# Patient Record
Sex: MALE | Race: WHITE | NOT HISPANIC OR LATINO | Employment: OTHER | URBAN - METROPOLITAN AREA
[De-identification: names, ages, dates, MRNs, and addresses within clinical notes are randomized per-mention and may not be internally consistent; named-entity substitution may affect disease eponyms.]

---

## 2018-02-28 PROBLEM — I26.99 MULTIPLE PULMONARY EMBOLI: Status: ACTIVE | Noted: 2018-02-28

## 2018-02-28 PROBLEM — I26.99 PULMONARY EMBOLISM AND INFARCTION: Status: ACTIVE | Noted: 2018-02-28

## 2018-02-28 PROBLEM — I50.811 ACUTE RIGHT VENTRICULAR HEART FAILURE: Status: ACTIVE | Noted: 2018-02-28

## 2018-02-28 PROBLEM — I50.43 ACUTE ON CHRONIC COMBINED SYSTOLIC AND DIASTOLIC CHF, NYHA CLASS 4: Status: ACTIVE | Noted: 2018-02-28

## 2018-02-28 PROBLEM — I50.813 ACUTE ON CHRONIC SYSTOLIC RIGHT HEART FAILURE: Status: ACTIVE | Noted: 2018-02-28

## 2018-03-01 PROBLEM — I26.99 PULMONARY EMBOLISM: Status: ACTIVE | Noted: 2018-03-01

## 2018-04-20 ENCOUNTER — TELEPHONE (OUTPATIENT)
Dept: TRANSPLANT | Facility: CLINIC | Age: 53
End: 2018-04-20

## 2018-04-20 DIAGNOSIS — I50.9 CONGESTIVE HEART FAILURE, UNSPECIFIED HF CHRONICITY, UNSPECIFIED HEART FAILURE TYPE: Primary | ICD-10-CM

## 2018-04-22 PROBLEM — R09.02 HYPOXEMIA: Status: ACTIVE | Noted: 2018-04-22

## 2018-04-22 PROBLEM — R93.89 ABNORMAL CHEST X-RAY: Status: ACTIVE | Noted: 2018-04-22

## 2018-04-23 PROBLEM — R63.8 ALTERATION IN NUTRITION: Status: ACTIVE | Noted: 2018-04-23

## 2018-04-24 PROBLEM — J90 PLEURAL EFFUSION: Status: ACTIVE | Noted: 2018-04-24

## 2018-04-25 PROBLEM — J18.9 RIGHT LOWER LOBE PNEUMONIA: Status: ACTIVE | Noted: 2018-04-25

## 2018-05-04 NOTE — TELEPHONE ENCOUNTER
REFERRAL NOTE:    Da Cruz has been referred to the pre-heart transplant office for consideration for orthotopic heart transplantation by Keenan Cyr Jr. Patient's appointments have been scheduled for 05/21/18 as requested to accommodate patients time request. Information was provided and questions were answered.  Copies of AHF/Transplant handout, appointment letter and directions to AHF/ Transplant Clinic were mailed to the patient. My contact information was given. Call PRN. Left message with Leanne with answering service of patient scheduled appointment information with Dr García. My contact information given.

## 2018-05-12 PROBLEM — I50.20 ACC/AHA STAGE C SYSTOLIC HEART FAILURE DUE TO ISCHEMIC CARDIOMYOPATHY: Status: ACTIVE | Noted: 2018-05-12

## 2018-05-12 PROBLEM — I95.9 HYPOTENSION: Status: ACTIVE | Noted: 2018-05-12

## 2018-05-12 PROBLEM — I25.5 ACC/AHA STAGE C SYSTOLIC HEART FAILURE DUE TO ISCHEMIC CARDIOMYOPATHY: Status: ACTIVE | Noted: 2018-05-12

## 2018-05-12 PROBLEM — E86.0 DEHYDRATION: Status: ACTIVE | Noted: 2018-05-12

## 2018-05-21 ENCOUNTER — HOSPITAL ENCOUNTER (INPATIENT)
Facility: HOSPITAL | Age: 53
LOS: 4 days | Discharge: LEFT BEFORE DISCHARGE | DRG: 291 | End: 2018-05-25
Attending: INTERNAL MEDICINE | Admitting: INTERNAL MEDICINE
Payer: MEDICARE

## 2018-05-21 ENCOUNTER — HOSPITAL ENCOUNTER (OUTPATIENT)
Dept: PULMONOLOGY | Facility: CLINIC | Age: 53
Discharge: HOME | DRG: 291 | End: 2018-05-21
Payer: MEDICARE

## 2018-05-21 ENCOUNTER — INITIAL CONSULT (OUTPATIENT)
Dept: TRANSPLANT | Facility: CLINIC | Age: 53
DRG: 291 | End: 2018-05-21
Payer: MEDICARE

## 2018-05-21 ENCOUNTER — DOCUMENTATION ONLY (OUTPATIENT)
Dept: TRANSPLANT | Facility: CLINIC | Age: 53
End: 2018-05-21

## 2018-05-21 VITALS
SYSTOLIC BLOOD PRESSURE: 92 MMHG | WEIGHT: 210 LBS | HEIGHT: 71 IN | WEIGHT: 206.38 LBS | BODY MASS INDEX: 29.4 KG/M2 | HEART RATE: 51 BPM | BODY MASS INDEX: 28.89 KG/M2 | HEIGHT: 71 IN | DIASTOLIC BLOOD PRESSURE: 68 MMHG

## 2018-05-21 DIAGNOSIS — I50.9 ACUTE DECOMPENSATED HEART FAILURE: ICD-10-CM

## 2018-05-21 DIAGNOSIS — Z95.810 HISTORY OF IMPLANTABLE CARDIOVERTER-DEFIBRILLATOR (ICD) PLACEMENT: ICD-10-CM

## 2018-05-21 DIAGNOSIS — I26.99 MULTIPLE PULMONARY EMBOLI: ICD-10-CM

## 2018-05-21 DIAGNOSIS — I26.99 OTHER PULMONARY EMBOLISM WITHOUT ACUTE COR PULMONALE, UNSPECIFIED CHRONICITY: ICD-10-CM

## 2018-05-21 DIAGNOSIS — J15.212 PNEUMONIA OF RIGHT LOWER LOBE DUE TO METHICILLIN RESISTANT STAPHYLOCOCCUS AUREUS (MRSA): ICD-10-CM

## 2018-05-21 DIAGNOSIS — I50.811 ACUTE RIGHT VENTRICULAR HEART FAILURE: ICD-10-CM

## 2018-05-21 DIAGNOSIS — I25.10 CORONARY ARTERY DISEASE INVOLVING NATIVE CORONARY ARTERY OF NATIVE HEART WITHOUT ANGINA PECTORIS: Primary | Chronic | ICD-10-CM

## 2018-05-21 DIAGNOSIS — I50.43 ACUTE ON CHRONIC COMBINED SYSTOLIC AND DIASTOLIC HEART FAILURE: Primary | ICD-10-CM

## 2018-05-21 DIAGNOSIS — I50.43 ACUTE ON CHRONIC COMBINED SYSTOLIC AND DIASTOLIC CHF, NYHA CLASS 4: ICD-10-CM

## 2018-05-21 DIAGNOSIS — J86.9 EMPYEMA LUNG: ICD-10-CM

## 2018-05-21 DIAGNOSIS — I25.10 CORONARY ARTERY DISEASE INVOLVING NATIVE CORONARY ARTERY OF NATIVE HEART WITHOUT ANGINA PECTORIS: ICD-10-CM

## 2018-05-21 DIAGNOSIS — I50.9 CONGESTIVE HEART FAILURE, UNSPECIFIED HF CHRONICITY, UNSPECIFIED HEART FAILURE TYPE: ICD-10-CM

## 2018-05-21 DIAGNOSIS — I50.43 ACUTE ON CHRONIC COMBINED SYSTOLIC AND DIASTOLIC HEART FAILURE: ICD-10-CM

## 2018-05-21 DIAGNOSIS — I50.43 ACUTE ON CHRONIC COMBINED SYSTOLIC (CONGESTIVE) AND DIASTOLIC (CONGESTIVE) HEART FAILURE: ICD-10-CM

## 2018-05-21 LAB
BACTERIA #/AREA URNS AUTO: ABNORMAL /HPF
BILIRUB UR QL STRIP: NEGATIVE
CLARITY UR REFRACT.AUTO: ABNORMAL
COLOR UR AUTO: YELLOW
GLUCOSE UR QL STRIP: NEGATIVE
HGB UR QL STRIP: ABNORMAL
HYALINE CASTS UR QL AUTO: 0 /LPF
KETONES UR QL STRIP: NEGATIVE
LEUKOCYTE ESTERASE UR QL STRIP: NEGATIVE
MICROSCOPIC COMMENT: ABNORMAL
NITRITE UR QL STRIP: NEGATIVE
PH UR STRIP: 6 [PH] (ref 5–8)
PROT UR QL STRIP: ABNORMAL
RBC #/AREA URNS AUTO: 9 /HPF (ref 0–4)
SP GR UR STRIP: 1.01 (ref 1–1.03)
URN SPEC COLLECT METH UR: ABNORMAL
UROBILINOGEN UR STRIP-ACNC: NEGATIVE EU/DL
WBC #/AREA URNS AUTO: 4 /HPF (ref 0–5)

## 2018-05-21 PROCEDURE — 99900035 HC TECH TIME PER 15 MIN (STAT): Mod: NTX

## 2018-05-21 PROCEDURE — 3008F BODY MASS INDEX DOCD: CPT | Mod: CPTII,NTX,S$GLB, | Performed by: INTERNAL MEDICINE

## 2018-05-21 PROCEDURE — A4216 STERILE WATER/SALINE, 10 ML: HCPCS | Mod: NTX | Performed by: INTERNAL MEDICINE

## 2018-05-21 PROCEDURE — 63600175 PHARM REV CODE 636 W HCPCS: Mod: NTX | Performed by: INTERNAL MEDICINE

## 2018-05-21 PROCEDURE — 99223 1ST HOSP IP/OBS HIGH 75: CPT | Mod: NTX,,, | Performed by: INTERNAL MEDICINE

## 2018-05-21 PROCEDURE — 99999 PR PBB SHADOW E&M-EST. PATIENT-LVL III: CPT | Mod: PBBFAC,TXP,, | Performed by: INTERNAL MEDICINE

## 2018-05-21 PROCEDURE — 94761 N-INVAS EAR/PLS OXIMETRY MLT: CPT | Mod: NTX

## 2018-05-21 PROCEDURE — 25000003 PHARM REV CODE 250: Mod: NTX | Performed by: INTERNAL MEDICINE

## 2018-05-21 PROCEDURE — 81001 URINALYSIS AUTO W/SCOPE: CPT | Mod: NTX

## 2018-05-21 PROCEDURE — 20600001 HC STEP DOWN PRIVATE ROOM: Mod: NTX

## 2018-05-21 PROCEDURE — 94640 AIRWAY INHALATION TREATMENT: CPT | Mod: NTX

## 2018-05-21 PROCEDURE — 99205 OFFICE O/P NEW HI 60 MIN: CPT | Mod: NTX,S$GLB,, | Performed by: INTERNAL MEDICINE

## 2018-05-21 PROCEDURE — 93010 ELECTROCARDIOGRAM REPORT: CPT | Mod: NTX,,, | Performed by: INTERNAL MEDICINE

## 2018-05-21 PROCEDURE — 94664 DEMO&/EVAL PT USE INHALER: CPT | Mod: NTX

## 2018-05-21 PROCEDURE — 25000242 PHARM REV CODE 250 ALT 637 W/ HCPCS: Mod: NTX | Performed by: INTERNAL MEDICINE

## 2018-05-21 PROCEDURE — 94618 PULMONARY STRESS TESTING: CPT | Mod: NTX,S$GLB,, | Performed by: INTERNAL MEDICINE

## 2018-05-21 PROCEDURE — 27000646 HC AEROBIKA DEVICE: Mod: NTX

## 2018-05-21 PROCEDURE — 93005 ELECTROCARDIOGRAM TRACING: CPT | Mod: NTX

## 2018-05-21 RX ORDER — ALPRAZOLAM 1 MG/1
1 TABLET ORAL 3 TIMES DAILY PRN
Status: DISCONTINUED | OUTPATIENT
Start: 2018-05-21 | End: 2018-05-25 | Stop reason: HOSPADM

## 2018-05-21 RX ORDER — DOCUSATE SODIUM 100 MG/1
100 CAPSULE, LIQUID FILLED ORAL 2 TIMES DAILY
Status: DISCONTINUED | OUTPATIENT
Start: 2018-05-21 | End: 2018-05-25 | Stop reason: HOSPADM

## 2018-05-21 RX ORDER — CIPROFLOXACIN 500 MG/1
500 TABLET ORAL EVERY 12 HOURS
Status: DISCONTINUED | OUTPATIENT
Start: 2018-05-21 | End: 2018-05-24

## 2018-05-21 RX ORDER — ALBUTEROL SULFATE 2 MG/5ML
2 SYRUP ORAL 3 TIMES DAILY
Status: DISCONTINUED | OUTPATIENT
Start: 2018-05-21 | End: 2018-05-21

## 2018-05-21 RX ORDER — AMOXICILLIN AND CLAVULANATE POTASSIUM 875; 125 MG/1; MG/1
1 TABLET, FILM COATED ORAL EVERY 12 HOURS
Status: DISCONTINUED | OUTPATIENT
Start: 2018-05-21 | End: 2018-05-24

## 2018-05-21 RX ORDER — BENZONATATE 100 MG/1
100 CAPSULE ORAL 3 TIMES DAILY PRN
Status: DISCONTINUED | OUTPATIENT
Start: 2018-05-21 | End: 2018-05-25 | Stop reason: HOSPADM

## 2018-05-21 RX ORDER — SODIUM CHLORIDE 0.9 % (FLUSH) 0.9 %
3 SYRINGE (ML) INJECTION EVERY 8 HOURS
Status: DISCONTINUED | OUTPATIENT
Start: 2018-05-21 | End: 2018-05-25 | Stop reason: HOSPADM

## 2018-05-21 RX ORDER — POTASSIUM CHLORIDE 20 MEQ/1
20 TABLET, EXTENDED RELEASE ORAL DAILY
Status: DISCONTINUED | OUTPATIENT
Start: 2018-05-21 | End: 2018-05-21

## 2018-05-21 RX ORDER — POTASSIUM CHLORIDE 20 MEQ/1
40 TABLET, EXTENDED RELEASE ORAL
Status: COMPLETED | OUTPATIENT
Start: 2018-05-21 | End: 2018-05-21

## 2018-05-21 RX ORDER — PANTOPRAZOLE SODIUM 40 MG/1
40 TABLET, DELAYED RELEASE ORAL DAILY
Status: DISCONTINUED | OUTPATIENT
Start: 2018-05-21 | End: 2018-05-25 | Stop reason: HOSPADM

## 2018-05-21 RX ORDER — POTASSIUM CHLORIDE 20 MEQ/1
40 TABLET, EXTENDED RELEASE ORAL DAILY
Status: DISCONTINUED | OUTPATIENT
Start: 2018-05-22 | End: 2018-05-23

## 2018-05-21 RX ORDER — ASPIRIN 81 MG/1
81 TABLET ORAL DAILY
Status: DISCONTINUED | OUTPATIENT
Start: 2018-05-22 | End: 2018-05-25 | Stop reason: HOSPADM

## 2018-05-21 RX ORDER — FUROSEMIDE 10 MG/ML
80 INJECTION INTRAMUSCULAR; INTRAVENOUS ONCE
Status: COMPLETED | OUTPATIENT
Start: 2018-05-21 | End: 2018-05-21

## 2018-05-21 RX ORDER — TIOTROPIUM BROMIDE 18 UG/1
1 CAPSULE ORAL; RESPIRATORY (INHALATION) DAILY
Status: DISCONTINUED | OUTPATIENT
Start: 2018-05-22 | End: 2018-05-25 | Stop reason: HOSPADM

## 2018-05-21 RX ORDER — ACETAMINOPHEN 325 MG/1
650 TABLET ORAL EVERY 6 HOURS PRN
Status: DISCONTINUED | OUTPATIENT
Start: 2018-05-21 | End: 2018-05-25 | Stop reason: HOSPADM

## 2018-05-21 RX ORDER — ROSUVASTATIN CALCIUM 20 MG/1
40 TABLET, COATED ORAL DAILY
Status: DISCONTINUED | OUTPATIENT
Start: 2018-05-22 | End: 2018-05-25 | Stop reason: HOSPADM

## 2018-05-21 RX ORDER — IPRATROPIUM BROMIDE AND ALBUTEROL SULFATE 2.5; .5 MG/3ML; MG/3ML
3 SOLUTION RESPIRATORY (INHALATION)
Status: DISCONTINUED | OUTPATIENT
Start: 2018-05-21 | End: 2018-05-25 | Stop reason: HOSPADM

## 2018-05-21 RX ADMIN — ALPRAZOLAM 1 MG: 1 TABLET ORAL at 04:05

## 2018-05-21 RX ADMIN — ALPRAZOLAM 1 MG: 1 TABLET ORAL at 11:05

## 2018-05-21 RX ADMIN — SODIUM CHLORIDE, PRESERVATIVE FREE 3 ML: 5 INJECTION INTRAVENOUS at 08:05

## 2018-05-21 RX ADMIN — POTASSIUM CHLORIDE 40 MEQ: 1500 TABLET, EXTENDED RELEASE ORAL at 03:05

## 2018-05-21 RX ADMIN — POTASSIUM CHLORIDE 40 MEQ: 1500 TABLET, EXTENDED RELEASE ORAL at 04:05

## 2018-05-21 RX ADMIN — APIXABAN 5 MG: 5 TABLET, FILM COATED ORAL at 08:05

## 2018-05-21 RX ADMIN — CIPROFLOXACIN HYDROCHLORIDE 500 MG: 500 TABLET, FILM COATED ORAL at 08:05

## 2018-05-21 RX ADMIN — PANTOPRAZOLE SODIUM 40 MG: 40 TABLET, DELAYED RELEASE ORAL at 03:05

## 2018-05-21 RX ADMIN — IPRATROPIUM BROMIDE AND ALBUTEROL SULFATE 3 ML: .5; 3 SOLUTION RESPIRATORY (INHALATION) at 03:05

## 2018-05-21 RX ADMIN — AMOXICILLIN AND CLAVULANATE POTASSIUM 1 TABLET: 875; 125 TABLET, FILM COATED ORAL at 08:05

## 2018-05-21 RX ADMIN — FUROSEMIDE 10 MG/HR: 10 INJECTION, SOLUTION INTRAMUSCULAR; INTRAVENOUS at 04:05

## 2018-05-21 RX ADMIN — ACETAMINOPHEN 650 MG: 325 TABLET ORAL at 11:05

## 2018-05-21 RX ADMIN — IPRATROPIUM BROMIDE AND ALBUTEROL SULFATE 3 ML: .5; 3 SOLUTION RESPIRATORY (INHALATION) at 09:05

## 2018-05-21 RX ADMIN — DOCUSATE SODIUM 100 MG: 100 CAPSULE, LIQUID FILLED ORAL at 08:05

## 2018-05-21 RX ADMIN — ACETAMINOPHEN 650 MG: 325 TABLET ORAL at 04:05

## 2018-05-21 RX ADMIN — FUROSEMIDE 80 MG: 10 INJECTION, SOLUTION INTRAMUSCULAR; INTRAVENOUS at 03:05

## 2018-05-21 NOTE — PROGRESS NOTES
[facility]  Heart Transplant  Progress Note    Patient Name: Da Cruz  MRN: [id number]  Admission Date: 5/21/2018  Hospital Length of Stay: 0 days  Attending Physician: João Kessler MD  Primary Care Provider: Pool Zambrano Iii, MD  Principal Problem:Acute on chronic combined systolic and diastolic heart failure    Subjective:     Past Medical History:   Diagnosis Date    Anxiety     CAD (coronary artery disease)     Carpal tunnel syndrome     CHF (congestive heart failure)     Hyperlipidemia     Kidney stones     Microscopic hematuria     Pulmonary emboli     S/P coronary artery stent placement     believes 6-7- unsure on amount    Shortness of breath on exertion      Past Surgical History:   Procedure Laterality Date    ABDOMINAL SURGERY      dick fundiplication    defibrilator      HAND SURGERY      left hand index finger tendon repair    heart stents       Review of patient's allergies indicates:   Allergen Reactions    Indomethacin      Current Facility-Administered Medications   Medication    acetaminophen tablet 650 mg    albuterol-ipratropium 2.5 mg-0.5 mg/3 mL nebulizer solution 3 mL    ALPRAZolam tablet 1 mg    amoxicillin-clavulanate 875-125mg per tablet 1 tablet    apixaban tablet 5 mg    [START ON 5/22/2018] aspirin EC tablet 81 mg    benzonatate capsule 100 mg    ciprofloxacin HCl tablet 500 mg    docusate sodium capsule 100 mg    furosemide (LASIX) 2 mg/mL in sodium chloride 0.9% 100 mL infusion (conc: 2 mg/mL)    [START ON 5/22/2018] metoprolol succinate (TOPROL-XL) 24 hr split tablet 12.5 mg    pantoprazole EC tablet 40 mg    [START ON 5/22/2018] potassium chloride SA CR tablet 40 mEq    [START ON 5/22/2018] rosuvastatin tablet 40 mg    sodium chloride 0.9% flush 3 mL    [START ON 5/22/2018] tiotropium inhalation capsule 18 mcg     Family History     Problem Relation (Age of Onset)    Diabetes Mother        Social History Main Topics     Smoking status: Current Some Day Smoker     Packs/day: 0.25     Years: 35.00    Smokeless tobacco: Never Used    Alcohol use No    Drug use: No    Sexual activity: Yes     Partners: Female     Review of Systems   Constitutional: Positive for fatigue. Negative for chills, diaphoresis and fever.   HENT: Negative for congestion, nosebleeds, postnasal drip and sore throat.    Eyes: Negative.    Respiratory: Positive for cough and shortness of breath.    Cardiovascular: Positive for leg swelling. Negative for chest pain and palpitations.   Gastrointestinal: Positive for abdominal distention. Negative for abdominal pain, constipation, diarrhea, nausea and vomiting.   Endocrine: Negative.    Genitourinary: Negative.    Musculoskeletal: Negative.    Neurological: Positive for weakness. Negative for seizures, syncope, light-headedness and headaches.   Hematological: Negative.    Psychiatric/Behavioral: Negative.       Objective:     Vital Signs (Most Recent):  Temp: 97.6 °F (36.4 °C) (05/21/18 1200)  Pulse: 107 (05/21/18 1606)  Resp: 18 (05/21/18 1606)  BP: 100/77 (05/21/18 1606)  SpO2: 96 % (05/21/18 1606) Vital Signs (24h Range):  Temp:  [97.6 °F (36.4 °C)] 97.6 °F (36.4 °C)  Pulse:  [] 107  Resp:  [17-20] 18  SpO2:  [96 %-100 %] 96 %  BP: ()/(62-77) 100/77   Patient Vitals for the past 72 hrs (Last 3 readings):   Weight   05/21/18 1419 93.4 kg (206 lb)   05/21/18 1200 93.6 kg (206 lb 5.6 oz)     Body mass index is 28.73 kg/m².    Intake/Output Summary (Last 24 hours) at 05/21/18 1724  Last data filed at 05/21/18 1642   Gross per 24 hour   Intake              490 ml   Output              625 ml   Net             -135 ml     Physical Exam   Gen: NAD. Resting comfortably in bed  HEENT: NC/AT. PERRL  Neck: JVD elevated to jaw. Supple  Heart: Regular with mild tachycardia. +S3 heena. No murmrus heard  Lungs: Scant bibasilar rales with good air movement.   Abd: Soft. Non tender. Mild distention with no  "fluid wave. Normal BS\  Ext: Bilateral LE edema (2+). Warm distally  Neuro: AAO x 3. NO focal deficits   Vitals reviewed    Significant Labs:  CBC:    Recent Labs  Lab 05/16/18 0617 05/21/18 0716   WBC 5.50 5.14   RBC 4.76 4.58*   HGB 13.1* 12.0*   HCT 40.7 40.4    252   MCV 86 88   MCH 27.6 26.2*   MCHC 32.2 29.7*     BNP:    Recent Labs  Lab 05/21/18 0716   BNP 3,094*     CMP:    Recent Labs  Lab 05/16/18 0617 05/21/18 0716   GLU 88 62*   CALCIUM 9.5 9.7   ALBUMIN  --  3.1*   PROT  --  6.8   * 139   K 4.5 3.1*   CO2 28 29   CL 96 100   BUN 18 18   CREATININE 0.80 1.0   ALKPHOS  --  175*   ALT  --  24   AST  --  29   BILITOT  --  0.9      I have reviewed all pertinent labs within the past 24 hours.    Diagnostic Results:  CXR: X-ray Chest Pa And Lateral: Pending       Assessment and Plan:     "Mr. Cruz is a 53 y.o. year old White male who has presents to be considered for advanced surgical options (LVAD/OHT).      Patient presents after several admissions for various issues including ADHF and infection since January of this year. He states he has had ICM, s/p LAD and RCA PCI following MI in 2009, after which he initially did quite well, however the last 7 months or so has had dramatic decline in how he is doing. Was smoking and drinking (not drinking very heavily at all), but smoking up until January of this year. Still has cigarettes once in a while, however not recently. No ETOH at all anymore. January had an admisson for ADHF, was subsequently discharged home. February developed unilateral leg swelling of left LE, with subsequently described bilateral submassive pulmonary embolism (CTscan of chest with IV contrast last taken 05/07/18 at [facility] demonstrates residual PE in right middle and right lower pulmonary artery). He was placed on eliquis after this. Subsequently had admission to [facility] for MRSA pneumonia, appears from notes that he left AMA without completing treatment, patient " states that he actually has left 3 different hospitals AMA due to not feeling he was getting appropriate/adequate care, and he feels he has continued to decline. Got into a little argument with his mother about this at the time he told me this.      He is coughing a significant amount, increased since the most recent admission he had at [facility], left the hospital 05/16/18, where he was admitted for 3 days for extreme hypotension and what patient describes as hypovolemia. He was admitted to the ICU with hypotension of SBP to 60s, he had recently been seen in clinic where his systolics had been stable in the 80s. He was started on IV levophed, had significant wheezing, CT chest with suspicion for empyema and/or lung abscess, unchanged, opacities in left lingula unchanged as well. Appears uncertain that he received significant fluids in hospital, however did have lasix held until he could follow up with us, was started on metoprolol 12.5mg po daily as well, and per pulmonary was recommended 1 month of oral abx treatment with no indication for VATS.       Additionally he has increased abdominal swelling since discharge, although he had his mother state he has had significantly more abdominal swelling in the past.      CT Chest non-contrast: 05/14/18:  1. The drainage catheter is been removed  2. Right basilar pleural collection and lung consolidation suspicious for empyema and/or lung abscess unchanged  3. Opacities left lingula unchanged with nothing new     Most recent CTA of his chest appears to be from 05/04/18 at [facility] in care everywhere:  Outside CT scan without a report is available dated April 19, 2018. Patient also has a history of lung abscess and pulmonary embolus.  Study is degraded by motion artifact. Volume loss and scarring of the left lung base is identified. There is a cavitary lesion at the right lung base which appears to be decreased in size when compared to the prior study. This lesion  "currently measures 2.8 cm in greatest dimension compared to prior measurement of 4.7 cm. There is adjacent infiltration with pleural reaction. The bony structures demonstrate degenerative changes but nothing acute.  There is persistent thrombus within the right lower lobe pulmonary artery and possibly within the left upper lobe pulmonary artery. There is considerable contrast material within the inferior vena cava and right heart consistent with right heart strain. There is left atrial enlargement.     03/01/18 TTE (Dr. Nicholas):  LVEDD 7cm, LVEF 10%  TR mild-mod, peak TV gradient 21 mm Hg.  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 s/p PE earlier this year with residual clots seen on follow up CT scans   Wet / Warm on exam   BNP >3,000  CXR ordered (has recent history of empyema?)   Lasix 80mg IV x1 followed by gtt at 10/hr   GDMT: Continue low dose metoprolol XL for now   Strict I/Os and daily weights  Repeat TTE ordered   Will not start pathway at this point         Pulmonary embolism    - Continue apixaban 5mg BID as no invasive testing is currently being planned  - Consider repeat CT chest pending patient response to diuresis   - Repeat TTE ordered to evaluate RV function and give estimate for PA pressures           Coronary artery disease involving native coronary artery    - Continue ASA / Statin         Empyema lung    - s/p IR drainage earlier this year  - Continue PO ABX (cipro / augmentin) for now   - Consider repeat CT chest if SOB worsens or he spikes a fever           Anxiety    - Continue low dose ativan TID PRN               Reed lees, DO  Heart Transplant  [facility]  "

## 2018-05-21 NOTE — NURSING
Dr. Gamble notified of patient arriving to room. No distress noted. VS assessed. Patient oriented to room and unit. Peripheral IV placed. Telemetry and Visi applied. Ipad given to patient. Will continue to monitor patient.

## 2018-05-21 NOTE — PLAN OF CARE
Problem: Patient Care Overview  Goal: Plan of Care Review  Outcome: Ongoing (interventions implemented as appropriate)  Plan of care reviewed with patient. Patient remains free from injury. No acute events noted at this time. Lasix gtt initiated. Chest xray completed. Will continue to monitor.

## 2018-05-21 NOTE — H&P
"[facility]  Heart Transplant  H&P    Patient Name: Da Cruz  MRN: [id number]  Admission Date: 5/21/2018  Attending Physician: João Kessler MD  Primary Care Provider: Pool Zambrano Iii, MD  Principal Problem:Acute on chronic combined systolic and diastolic heart failure    Subjective:     History of Present Illness:  "Mr. Cruz is a 53 y.o. year old White male who has presents to be considered for advanced surgical options (LVAD/OHT).      Patient presents after several admissions for various issues including ADHF and infection since January of this year. He states he has had ICM, s/p LAD and RCA PCI following MI in 2009, after which he initially did quite well, however the last 7 months or so has had dramatic decline in how he is doing. Was smoking and drinking (not drinking very heavily at all), but smoking up until January of this year. Still has cigarettes once in a while, however not recently. No ETOH at all anymore. January had an admisson for ADHF, was subsequently discharged home. February developed unilateral leg swelling of left LE, with subsequently described bilateral submassive pulmonary embolism (CTscan of chest with IV contrast last taken 05/07/18 at [facility] demonstrates residual PE in right middle and right lower pulmonary artery). He was placed on eliquis after this. Subsequently had admission to [facility] for MRSA pneumonia, appears from notes that he left AMA without completing treatment, patient states that he actually has left 3 different hospitals AMA due to not feeling he was getting appropriate/adequate care, and he feels he has continued to decline. Got into a little argument with his mother about this at the time he told me this.      He is coughing a significant amount, increased since the most recent admission he had at [facility], left the hospital 05/16/18, where he was admitted for 3 days for extreme hypotension and what patient describes " as hypovolemia. He was admitted to the ICU with hypotension of SBP to 60s, he had recently been seen in clinic where his systolics had been stable in the 80s. He was started on IV levophed, had significant wheezing, CT chest with suspicion for empyema and/or lung abscess, unchanged, opacities in left lingula unchanged as well. Appears uncertain that he received significant fluids in hospital, however did have lasix held until he could follow up with us, was started on metoprolol 12.5mg po daily as well, and per pulmonary was recommended 1 month of oral abx treatment with no indication for VATS.       Additionally he has increased abdominal swelling since discharge, although he had his mother state he has had significantly more abdominal swelling in the past.      CT Chest non-contrast: 05/14/18:  1. The drainage catheter is been removed  2. Right basilar pleural collection and lung consolidation suspicious for empyema and/or lung abscess unchanged  3. Opacities left lingula unchanged with nothing new     Most recent CTA of his chest appears to be from 05/04/18 at [facility] in care everywhere:  Outside CT scan without a report is available dated April 19, 2018. Patient also has a history of lung abscess and pulmonary embolus.  Study is degraded by motion artifact. Volume loss and scarring of the left lung base is identified. There is a cavitary lesion at the right lung base which appears to be decreased in size when compared to the prior study. This lesion currently measures 2.8 cm in greatest dimension compared to prior measurement of 4.7 cm. There is adjacent infiltration with pleural reaction. The bony structures demonstrate degenerative changes but nothing acute.  There is persistent thrombus within the right lower lobe pulmonary artery and possibly within the left upper lobe pulmonary artery. There is considerable contrast material within the inferior vena cava and right heart consistent with right heart strain.  "There is left atrial enlargement.     03/01/18 TTE (Dr. Nicholas):  LVEDD 7cm, LVEF 10%  TR mild-mod, peak TV gradient 21 mm Hg.  MR mod-severe 3+  AV tricuspid"    Past Medical History:   Diagnosis Date    Anxiety     CAD (coronary artery disease)     Carpal tunnel syndrome     CHF (congestive heart failure)     Hyperlipidemia     Kidney stones     Microscopic hematuria     Pulmonary emboli     S/P coronary artery stent placement     believes 6-7- unsure on amount    Shortness of breath on exertion      Past Surgical History:   Procedure Laterality Date    ABDOMINAL SURGERY      dick fundiplication    defibrilator      HAND SURGERY      left hand index finger tendon repair    heart stents       Review of patient's allergies indicates:   Allergen Reactions    Indomethacin      Current Facility-Administered Medications   Medication    acetaminophen tablet 650 mg    albuterol-ipratropium 2.5 mg-0.5 mg/3 mL nebulizer solution 3 mL    ALPRAZolam tablet 1 mg    amoxicillin-clavulanate 875-125mg per tablet 1 tablet    apixaban tablet 5 mg    [START ON 5/22/2018] aspirin EC tablet 81 mg    benzonatate capsule 100 mg    ciprofloxacin HCl tablet 500 mg    docusate sodium capsule 100 mg    furosemide (LASIX) 2 mg/mL in sodium chloride 0.9% 100 mL infusion (conc: 2 mg/mL)    [START ON 5/22/2018] metoprolol succinate (TOPROL-XL) 24 hr split tablet 12.5 mg    pantoprazole EC tablet 40 mg    [START ON 5/22/2018] potassium chloride SA CR tablet 40 mEq    [START ON 5/22/2018] rosuvastatin tablet 40 mg    sodium chloride 0.9% flush 3 mL    [START ON 5/22/2018] tiotropium inhalation capsule 18 mcg     Family History     Problem Relation (Age of Onset)    Diabetes Mother        Social History Main Topics    Smoking status: Current Some Day Smoker     Packs/day: 0.25     Years: 35.00    Smokeless tobacco: Never Used    Alcohol use No    Drug use: No    Sexual activity: Yes     Partners: Female "     Review of Systems   Constitutional: Positive for fatigue. Negative for chills, diaphoresis and fever.   HENT: Negative for congestion, nosebleeds, postnasal drip and sore throat.    Eyes: Negative.    Respiratory: Positive for cough and shortness of breath.    Cardiovascular: Positive for leg swelling. Negative for chest pain and palpitations.   Gastrointestinal: Positive for abdominal distention. Negative for abdominal pain, constipation, diarrhea, nausea and vomiting.   Endocrine: Negative.    Genitourinary: Negative.    Musculoskeletal: Negative.    Neurological: Positive for weakness. Negative for seizures, syncope, light-headedness and headaches.   Hematological: Negative.    Psychiatric/Behavioral: Negative.       Objective:     Vital Signs (Most Recent):  Temp: 97.6 °F (36.4 °C) (05/21/18 1200)  Pulse: 107 (05/21/18 1606)  Resp: 18 (05/21/18 1606)  BP: 100/77 (05/21/18 1606)  SpO2: 96 % (05/21/18 1606) Vital Signs (24h Range):  Temp:  [97.6 °F (36.4 °C)] 97.6 °F (36.4 °C)  Pulse:  [] 107  Resp:  [17-20] 18  SpO2:  [96 %-100 %] 96 %  BP: ()/(62-77) 100/77   Patient Vitals for the past 72 hrs (Last 3 readings):   Weight   05/21/18 1419 93.4 kg (206 lb)   05/21/18 1200 93.6 kg (206 lb 5.6 oz)     Body mass index is 28.73 kg/m².    Intake/Output Summary (Last 24 hours) at 05/21/18 1724  Last data filed at 05/21/18 1642   Gross per 24 hour   Intake              490 ml   Output              625 ml   Net             -135 ml     Physical Exam   Gen: NAD. Resting comfortably in bed  HEENT: NC/AT. PERRL  Neck: JVD elevated to jaw. Supple  Heart: Regular with mild tachycardia. +S3 heena. No murmrus heard  Lungs: Scant bibasilar rales with good air movement.   Abd: Soft. Non tender. Mild distention with no fluid wave. Normal BS\  Ext: Bilateral LE edema (2+). Warm distally  Neuro: AAO x 3. NO focal deficits   Vitals reviewed    Significant Labs:  CBC:    Recent Labs  Lab 05/16/18  0617 05/21/18  0716    WBC 5.50 5.14   RBC 4.76 4.58*   HGB 13.1* 12.0*   HCT 40.7 40.4    252   MCV 86 88   MCH 27.6 26.2*   MCHC 32.2 29.7*     BNP:    Recent Labs  Lab 05/21/18  0716   BNP 3,094*     CMP:    Recent Labs  Lab 05/16/18  0617 05/21/18  0716   GLU 88 62*   CALCIUM 9.5 9.7   ALBUMIN  --  3.1*   PROT  --  6.8   * 139   K 4.5 3.1*   CO2 28 29   CL 96 100   BUN 18 18   CREATININE 0.80 1.0   ALKPHOS  --  175*   ALT  --  24   AST  --  29   BILITOT  --  0.9      I have reviewed all pertinent labs within the past 24 hours.    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 s/p PE earlier this year with residual clots seen on follow up CT scans   Wet / Warm on exam   BNP >3,000  CXR ordered (has recent history of empyema?)   Lasix 80mg IV x1 followed by gtt at 10/hr   GDMT: Continue low dose metoprolol XL for now   Strict I/Os and daily weights  Repeat TTE ordered   Will not start pathway at this point         Pulmonary embolism    - Continue apixaban 5mg BID as no invasive testing is currently being planned  - Consider repeat CT chest pending patient response to diuresis   - Repeat TTE ordered to evaluate RV function and give estimate for PA pressures           Coronary artery disease involving native coronary artery    - Continue ASA / Statin         Empyema lung    - s/p IR drainage earlier this year  - Continue PO ABX (cipro / augmentin) for now   - Consider repeat CT chest if SOB worsens or he spikes a fever           Anxiety    - Continue low dose ativan TID PRN               Reed lees, DO  Heart Transplant  [facility]

## 2018-05-21 NOTE — PROCEDURES
Da Cruz is a 53 y.o.  male patient, who presents for a 6 minute walk test ordered by MD Ricky.  The diagnosis is Pre Heart Transplant.  The patient's BMI is 29.4 kg/m2.  Predicted distance (lower limit of normal) is 461.19 meters.      Test Results:    The test was not completed.  The patient stopped 1 times for a total of 275 seconds.  The total time walked was 85 seconds.  During walking, the patient reported:  Dyspnea, Lightheadedness. The patient used no assistive devices  during testing.     05/21/2018---------Distance: 60.96 meters (200 feet)     O2 Sat % Supplemental Oxygen Heart Rate Blood Pressure Trevno Scale   Pre-exercise  (Resting) 98 % Room Air 54 bpm Unable to obtain 7-8   During Exercise 99 % Room Air 109 bpm 103/63 mmHg 7-8   Post-exercise  (Recovery) 99 % Room Air  54 bpm   mmHg       Recovery Time: 66 seconds    Performing nurse/tech: Guerda Richardson RRT      PREVIOUS STUDY:   The patient has not had a previous study.      CLINICAL INTERPRETATION:  Six minute walk distance is 60.96 meters (200 feet) with very heavy dyspnea.  During exercise, there was no significant desaturation while breathing room air.  Heart rate increased significantly with walking.  This may represent a tachycardic response to exercise.  The patient reported non-pulmonary symptoms during exercise.  Severe exercise impairment is likely due to cardiovascular causes and subjective symptoms.  The patient did not complete the study, walking 85 seconds of the 360 second test.  No previous study performed.  Based upon age and body mass index, exercise capacity is less than predicted.

## 2018-05-21 NOTE — ASSESSMENT & PLAN NOTE
- Continue apixaban 5mg BID as no invasive testing is currently being planned  - Consider repeat CT chest pending patient response to diuresis   - Repeat TTE ordered to evaluate RV function and give estimate for PA pressures

## 2018-05-21 NOTE — ASSESSMENT & PLAN NOTE
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

## 2018-05-21 NOTE — HPI
""Mr. Cruz is a 53 y.o. year old White male who has presents to be considered for advanced surgical options (LVAD/OHT).      Patient presents after several admissions for various issues including ADHF and infection since January of this year. He states he has had ICM, s/p LAD and RCA PCI following MI in 2009, after which he initially did quite well, however the last 7 months or so has had dramatic decline in how he is doing. Was smoking and drinking (not drinking very heavily at all), but smoking up until January of this year. Still has cigarettes once in a while, however not recently. No ETOH at all anymore. January had an admisson for ADHF, was subsequently discharged home. February developed unilateral leg swelling of left LE, with subsequently described bilateral submassive pulmonary embolism (CTscan of chest with IV contrast last taken 05/07/18 at [facility] demonstrates residual PE in right middle and right lower pulmonary artery). He was placed on eliquis after this. Subsequently had admission to [facility] for MRSA pneumonia, appears from notes that he left AMA without completing treatment, patient states that he actually has left 3 different hospitals AMA due to not feeling he was getting appropriate/adequate care, and he feels he has continued to decline. Got into a little argument with his mother about this at the time he told me this.      He is coughing a significant amount, increased since the most recent admission he had at [facility], left the hospital 05/16/18, where he was admitted for 3 days for extreme hypotension and what patient describes as hypovolemia. He was admitted to the ICU with hypotension of SBP to 60s, he had recently been seen in clinic where his systolics had been stable in the 80s. He was started on IV levophed, had significant wheezing, CT chest with suspicion for empyema and/or lung abscess, unchanged, opacities in left lingula unchanged as well. Appears uncertain that " "he received significant fluids in hospital, however did have lasix held until he could follow up with us, was started on metoprolol 12.5mg po daily as well, and per pulmonary was recommended 1 month of oral abx treatment with no indication for VATS.       Additionally he has increased abdominal swelling since discharge, although he had his mother state he has had significantly more abdominal swelling in the past.      CT Chest non-contrast: 05/14/18:  1. The drainage catheter is been removed  2. Right basilar pleural collection and lung consolidation suspicious for empyema and/or lung abscess unchanged  3. Opacities left lingula unchanged with nothing new     Most recent CTA of his chest appears to be from 05/04/18 at [facility] in care everywhere:  Outside CT scan without a report is available dated April 19, 2018. Patient also has a history of lung abscess and pulmonary embolus.  Study is degraded by motion artifact. Volume loss and scarring of the left lung base is identified. There is a cavitary lesion at the right lung base which appears to be decreased in size when compared to the prior study. This lesion currently measures 2.8 cm in greatest dimension compared to prior measurement of 4.7 cm. There is adjacent infiltration with pleural reaction. The bony structures demonstrate degenerative changes but nothing acute.  There is persistent thrombus within the right lower lobe pulmonary artery and possibly within the left upper lobe pulmonary artery. There is considerable contrast material within the inferior vena cava and right heart consistent with right heart strain. There is left atrial enlargement.     03/01/18 TTE (Dr. Nicholas):  LVEDD 7cm, LVEF 10%  TR mild-mod, peak TV gradient 21 mm Hg.  MR mod-severe 3+  AV tricuspid"  "

## 2018-05-21 NOTE — LETTER
May 21, 2018        Keenan Cyr Jr.  [address] [facility] [address]  Phone: [phone]  Fax: [phone]             [facility]  [address]  [facility] [address]  Phone: [phone]   Patient: Da Cruz   MR Number: [id number]   YOB: 1965   Date of Visit: 5/21/2018       Dear Dr. Keenan Cyr Jr.    Thank you for referring Da Cruz to me for evaluation. Attached you will find relevant portions of my assessment and plan of care.    If you have questions, please do not hesitate to call me. I look forward to following Da Cruz along with you.    Sincerely,    Alina Garcaí MD    Enclosure    If you would like to receive this communication electronically, please contact [email or web address] or [phone] to request [employer] Link access.    [employer] Link is a tool which provides read-only access to select patient information with whom you have a relationship. Its easy to use and provides real time access to review your patients record including encounter summaries, notes, results, and demographic information.    If you feel you have received this communication in error or would no longer like to receive these types of communications, please e-mail [email or web address]

## 2018-05-21 NOTE — ASSESSMENT & PLAN NOTE
- s/p IR drainage earlier this year  - Continue PO ABX (cipro / augmentin) for now   - Consider repeat CT chest if SOB worsens or he spikes a fever

## 2018-05-21 NOTE — SUBJECTIVE & OBJECTIVE
Past Medical History:   Diagnosis Date    Anxiety     CAD (coronary artery disease)     Carpal tunnel syndrome     CHF (congestive heart failure)     Hyperlipidemia     Kidney stones     Microscopic hematuria     Pulmonary emboli     S/P coronary artery stent placement     believes 6-7- unsure on amount    Shortness of breath on exertion      Past Surgical History:   Procedure Laterality Date    ABDOMINAL SURGERY      dick fundiplication    defibrilator      HAND SURGERY      left hand index finger tendon repair    heart stents       Review of patient's allergies indicates:   Allergen Reactions    Indomethacin      Current Facility-Administered Medications   Medication    acetaminophen tablet 650 mg    albuterol-ipratropium 2.5 mg-0.5 mg/3 mL nebulizer solution 3 mL    ALPRAZolam tablet 1 mg    amoxicillin-clavulanate 875-125mg per tablet 1 tablet    apixaban tablet 5 mg    [START ON 5/22/2018] aspirin EC tablet 81 mg    benzonatate capsule 100 mg    ciprofloxacin HCl tablet 500 mg    docusate sodium capsule 100 mg    furosemide (LASIX) 2 mg/mL in sodium chloride 0.9% 100 mL infusion (conc: 2 mg/mL)    [START ON 5/22/2018] metoprolol succinate (TOPROL-XL) 24 hr split tablet 12.5 mg    pantoprazole EC tablet 40 mg    [START ON 5/22/2018] potassium chloride SA CR tablet 40 mEq    [START ON 5/22/2018] rosuvastatin tablet 40 mg    sodium chloride 0.9% flush 3 mL    [START ON 5/22/2018] tiotropium inhalation capsule 18 mcg     Family History     Problem Relation (Age of Onset)    Diabetes Mother        Social History Main Topics    Smoking status: Current Some Day Smoker     Packs/day: 0.25     Years: 35.00    Smokeless tobacco: Never Used    Alcohol use No    Drug use: No    Sexual activity: Yes     Partners: Female     Review of Systems   Constitutional: Positive for fatigue. Negative for chills, diaphoresis and fever.   HENT: Negative for congestion, nosebleeds, postnasal drip and  sore throat.    Eyes: Negative.    Respiratory: Positive for cough and shortness of breath.    Cardiovascular: Positive for leg swelling. Negative for chest pain and palpitations.   Gastrointestinal: Positive for abdominal distention. Negative for abdominal pain, constipation, diarrhea, nausea and vomiting.   Endocrine: Negative.    Genitourinary: Negative.    Musculoskeletal: Negative.    Neurological: Positive for weakness. Negative for seizures, syncope, light-headedness and headaches.   Hematological: Negative.    Psychiatric/Behavioral: Negative.       Objective:     Vital Signs (Most Recent):  Temp: 97.6 °F (36.4 °C) (05/21/18 1200)  Pulse: 107 (05/21/18 1606)  Resp: 18 (05/21/18 1606)  BP: 100/77 (05/21/18 1606)  SpO2: 96 % (05/21/18 1606) Vital Signs (24h Range):  Temp:  [97.6 °F (36.4 °C)] 97.6 °F (36.4 °C)  Pulse:  [] 107  Resp:  [17-20] 18  SpO2:  [96 %-100 %] 96 %  BP: ()/(62-77) 100/77   Patient Vitals for the past 72 hrs (Last 3 readings):   Weight   05/21/18 1419 93.4 kg (206 lb)   05/21/18 1200 93.6 kg (206 lb 5.6 oz)     Body mass index is 28.73 kg/m².    Intake/Output Summary (Last 24 hours) at 05/21/18 1724  Last data filed at 05/21/18 1642   Gross per 24 hour   Intake              490 ml   Output              625 ml   Net             -135 ml     Physical Exam   Gen: NAD. Resting comfortably in bed  HEENT: NC/AT. PERRL  Neck: JVD elevated to jaw. Supple  Heart: Regular with mild tachycardia. +S3 heena. No murmrus heard  Lungs: Scant bibasilar rales with good air movement.   Abd: Soft. Non tender. Mild distention with no fluid wave. Normal BS\  Ext: Bilateral LE edema (2+). Warm distally  Neuro: AAO x 3. NO focal deficits   Vitals reviewed    Significant Labs:  CBC:    Recent Labs  Lab 05/16/18  0617 05/21/18  0716   WBC 5.50 5.14   RBC 4.76 4.58*   HGB 13.1* 12.0*   HCT 40.7 40.4    252   MCV 86 88   MCH 27.6 26.2*   MCHC 32.2 29.7*     BNP:    Recent Labs  Lab 05/21/18  0716    BNP 3,094*     CMP:    Recent Labs  Lab 05/16/18  0617 05/21/18  0716   GLU 88 62*   CALCIUM 9.5 9.7   ALBUMIN  --  3.1*   PROT  --  6.8   * 139   K 4.5 3.1*   CO2 28 29   CL 96 100   BUN 18 18   CREATININE 0.80 1.0   ALKPHOS  --  175*   ALT  --  24   AST  --  29   BILITOT  --  0.9      I have reviewed all pertinent labs within the past 24 hours.    Diagnostic Results:  CXR: X-ray Chest Pa And Lateral: Pending

## 2018-05-21 NOTE — PROGRESS NOTES
Subjective:   Initial evaluation of heart transplant candidacy.    HPI:  Mr. Cruz is a 53 y.o. year old White male who has presents to be considered for advanced surgical options (LVAD/OHT).     Patient presents after several admissions for various issues including ADHF and infection since January of this year. He states he has had ICM, s/p LAD and RCA PCI following MI in 2009, after which he initially did quite well, however the last 7 months or so has had dramatic decline in how he is doing. Was smoking and drinking (not drinking very heavily at all), but smoking up until January of this year. Still has cigarettes once in a while, however not recently. No ETOH at all anymore. January had an admisson for ADHF, was subsequently discharged home. February developed unilateral leg swelling of left LE, with subsequently described bilateral submassive pulmonary embolism (CTscan of chest with IV contrast last taken 05/07/18 at [facility] demonstrates residual PE in right middle and right lower pulmonary artery). He was placed on eliquis after this. Subsequently had admission to [facility] for MRSA pneumonia, appears from notes that he left AMA without completing treatment, patient states that he actually has left 3 different hospitals AMA due to not feeling he was getting appropriate/adequate care, and he feels he has continued to decline. Got into a little argument with his mother about this at the time he told me this.     He is coughing a significant amount, increased since the most recent admission he had at [facility], left the hospital 05/16/18, where he was admitted for 3 days for extreme hypotension and what patient describes as hypovolemia. He was admitted to the ICU with hypotension of SBP to 60s, he had recently been seen in clinic where his systolics had been stable in the 80s. He was started on IV levophed, had significant wheezing, CT chest with suspicion for empyema and/or lung abscess,  unchanged, opacities in left lingula unchanged as well. Appears uncertain that he received significant fluids in hospital, however did have lasix held until he could follow up with us, was started on metoprolol 12.5mg po daily as well, and per pulmonary was recommended 1 month of oral abx treatment with no indication for VATS.      Additionally he has increased abdominal swelling since discharge, although he had his mother state he has had significantly more abdominal swelling in the past. Hands and feet stay cool, when I laid him back for evaluation on exam table, he had significant coughing after siting back up. Did not walk in, in a wheelchair.     CT Chest non-contrast: 05/14/18:  1. The drainage catheter is been removed  2. Right basilar pleural collection and lung consolidation suspicious for empyema and/or lung abscess unchanged  3. Opacities left lingula unchanged with nothing new    Most recent CTA of his chest appears to be from 05/04/18 at [facility] in care everywhere:  Outside CT scan without a report is available dated April 19, 2018. Patient also has a history of lung abscess and pulmonary embolus.  Study is degraded by motion artifact. Volume loss and scarring of the left lung base is identified. There is a cavitary lesion at the right lung base which appears to be decreased in size when compared to the prior study. This lesion currently measures 2.8 cm in greatest dimension compared to prior measurement of 4.7 cm. There is adjacent infiltration with pleural reaction. The bony structures demonstrate degenerative changes but nothing acute.  There is persistent thrombus within the right lower lobe pulmonary artery and possibly within the left upper lobe pulmonary artery. There is considerable contrast material within the inferior vena cava and right heart consistent with right heart strain. There is left atrial enlargement.    03/01/18 TTE (Dr. Nicholas):  LVEDD 7cm, LVEF 10%  TR mild-mod, peak TV gradient 21  mm Hg.  MR mod-severe 3+  AV tricuspid    Past Medical History:   Diagnosis Date    Anxiety     CAD (coronary artery disease)     Carpal tunnel syndrome     CHF (congestive heart failure)     Hyperlipidemia     Kidney stones     Microscopic hematuria     Pulmonary emboli     S/P coronary artery stent placement     believes 6-7- unsure on amount    Shortness of breath on exertion      Past Surgical History:   Procedure Laterality Date    ABDOMINAL SURGERY      dick fundiplication    defibrilator      HAND SURGERY      left hand index finger tendon repair    heart stents         Family History   Problem Relation Age of Onset    Diabetes Mother        Review of Systems   Constitution: Positive for decreased appetite, malaise/fatigue and weight gain. Negative for chills, diaphoresis, fever, weakness and weight loss.   HENT: Negative for congestion.    Eyes: Negative for blurred vision and visual disturbance.   Cardiovascular: Positive for dyspnea on exertion, leg swelling, orthopnea and paroxysmal nocturnal dyspnea. Negative for chest pain, irregular heartbeat, near-syncope, palpitations and syncope.   Respiratory: Positive for cough, shortness of breath and wheezing. Negative for sleep disturbances due to breathing and snoring.    Hematologic/Lymphatic: Negative for bleeding problem. Does not bruise/bleed easily.   Skin: Negative for poor wound healing and rash.   Musculoskeletal: Negative for arthritis, joint pain and muscle weakness.   Gastrointestinal: Positive for bloating and anorexia. Negative for abdominal pain, constipation, diarrhea, hematemesis, hematochezia, melena, nausea and vomiting.   Genitourinary: Negative for frequency and urgency.   Neurological: Positive for excessive daytime sleepiness. Negative for difficulty with concentration, dizziness, headaches and light-headedness.   Psychiatric/Behavioral: Negative for depression. The patient has insomnia and is nervous/anxious.   "      Objective:   Blood pressure 92/68, pulse (!) 51, height 5' 11" (1.803 m), weight 93.6 kg (206 lb 5.6 oz).body mass index is 28.78 kg/m².    Physical Exam   Constitutional: He is oriented to person, place, and time. He appears well-developed and well-nourished. No distress.   HENT:   Head: Normocephalic and atraumatic.   Nose: Nose normal.   Mouth/Throat: Oropharynx is clear and moist. No oropharyngeal exudate.   Eyes: Conjunctivae and EOM are normal. Pupils are equal, round, and reactive to light. No scleral icterus.   Neck: Normal range of motion. Neck supple. JVD (jawline at 45 degrees, + EJ distention, + HJR) present. No tracheal deviation present. No thyromegaly present.   Cardiovascular: Normal rate, regular rhythm, S1 normal and S2 normal.  Exam reveals no gallop and no friction rub.    Murmur (3/6 TR Murmur) heard.  Pulmonary/Chest: Effort normal and breath sounds normal. No respiratory distress. He has no wheezes. He has no rales. He exhibits no tenderness.   Abdominal: Soft. Bowel sounds are normal. He exhibits distension. He exhibits no mass. There is no tenderness. There is no rebound and no guarding.   Musculoskeletal: Normal range of motion. He exhibits edema (trace to 1+ bilaterally, left greater than right). He exhibits no tenderness.   Neurological: He is alert and oriented to person, place, and time. Coordination normal.   Skin: Skin is warm and dry. No rash noted. He is not diaphoretic. No erythema. No pallor.   Some blue tinge to fingers after a coughing fit   Psychiatric: He has a normal mood and affect. His behavior is normal. Judgment and thought content normal.   Nursing note and vitals reviewed.    Labs:      Chemistry        Component Value Date/Time     05/21/2018 0716    K 3.1 (L) 05/21/2018 0716     05/21/2018 0716    CO2 29 05/21/2018 0716    BUN 18 05/21/2018 0716    CREATININE 1.0 05/21/2018 0716    GLU 62 (L) 05/21/2018 0716        Component Value Date/Time    " CALCIUM 9.7 05/21/2018 0716    ALKPHOS 175 (H) 05/21/2018 0716    AST 29 05/21/2018 0716    ALT 24 05/21/2018 0716    BILITOT 0.9 05/21/2018 0716    ESTGFRAFRICA >60.0 05/21/2018 0716    EGFRNONAA >60.0 05/21/2018 0716          Magnesium   Date Value Ref Range Status   04/23/2018 2.1 1.6 - 2.6 mg/dL Final     Lab Results   Component Value Date    WBC 5.14 05/21/2018    HGB 12.0 (L) 05/21/2018    HCT 40.4 05/21/2018    MCV 88 05/21/2018     05/21/2018     BNP   Date Value Ref Range Status   05/21/2018 3,094 (H) 0 - 99 pg/mL Final     Comment:     Values of less than 100 pg/ml are consistent with non-CHF populations.         Labs were reviewed with the patient.    Assessment:      1. Acute on chronic combined systolic and diastolic heart failure    2. Coronary artery disease involving native coronary artery of native heart without angina pectoris    3. Pneumonia of right lower lobe due to methicillin resistant Staphylococcus aureus (MRSA)    4. History of implantable cardioverter-defibrillator (ICD) placement    5. Acute right ventricular heart failure    6. Multiple pulmonary emboli        Plan:   Called and spoke with Dr. Main, patient's primary cardiologist. Discussed his case, relayed we were admitting patient for further evaluation. He states despite patient leaving [place] a few times this year, he has traditionally been a compliant patient, come for all of his clinic appointments. He does think he may not be complaint with diet and fluids, etc. Discussed the last evaluation of his CAD, Dr. Main was looking at his records, and states it appears he had a stress test about 1 year or so ago, [facility] prior to that in 2016 demonstrated patent stents.   May consider looking at this again, however discussed with both patient and his mother and subsequently with Dr. Main, that we are concerned about the effusion and cavitary lesion seen, and would need to have this figured out prior to further evaluation for advanced  options. Regarding the DVT with PE, Dr. Main states they were thinking patient was not very active, sedentary which led to DVT and subsequent PE, additionally may or may not have quit smoking at this time. May need further evaluation for this as well to see if patient is hypercoaguable, however would hold off on evaluation for now.     Patient is now NYHA IV ACC stage D  Recommend 2 gram sodium restriction and 1500cc fluid restriction.  Encourage physical activity with graded exercise program.  Requested patient to weigh themselves daily, and to notify us if their weight increases by more than 3 lbs in 1 day or 5 lbs in 1 week.     Transplant Candidacy: Patient is a 53 y.o. year old male with heart failure is being seen for possible advanced options, however at this time, with recent active infection and cavitary lesion with effusion on CT, with additonal recent PE which appears to be causing right sided symptoms, would hold off on transplant evaluation for now.     UNOS Patient Status  Functional Status: 30% - Severely disabled: hospitalization is indicated, death not imminent  Physical Capacity: No Limitations  Working for Income: No  If no, reason not working: Unknown    Alina García MD

## 2018-05-21 NOTE — PROGRESS NOTES
Pt accompanied to admit via WC, along with family member and son. Awaiting room availability, NAD noted.

## 2018-05-22 LAB
ALBUMIN SERPL BCP-MCNC: 3.1 G/DL
ALP SERPL-CCNC: 145 U/L
ALT SERPL W/O P-5'-P-CCNC: 22 U/L
ANION GAP SERPL CALC-SCNC: 10 MMOL/L
ANION GAP SERPL CALC-SCNC: 13 MMOL/L
AST SERPL-CCNC: 26 U/L
BASOPHILS # BLD AUTO: 0.08 K/UL
BASOPHILS NFR BLD: 1.6 %
BILIRUB SERPL-MCNC: 1.1 MG/DL
BUN SERPL-MCNC: 17 MG/DL
BUN SERPL-MCNC: 18 MG/DL
CALCIUM SERPL-MCNC: 9.2 MG/DL
CALCIUM SERPL-MCNC: 9.6 MG/DL
CHLORIDE SERPL-SCNC: 98 MMOL/L
CHLORIDE SERPL-SCNC: 98 MMOL/L
CO2 SERPL-SCNC: 26 MMOL/L
CO2 SERPL-SCNC: 29 MMOL/L
CREAT SERPL-MCNC: 0.9 MG/DL
CREAT SERPL-MCNC: 1 MG/DL
DIASTOLIC DYSFUNCTION: YES
DIFFERENTIAL METHOD: ABNORMAL
EOSINOPHIL # BLD AUTO: 0.2 K/UL
EOSINOPHIL NFR BLD: 4.5 %
ERYTHROCYTE [DISTWIDTH] IN BLOOD BY AUTOMATED COUNT: 19.6 %
EST. GFR  (AFRICAN AMERICAN): >60 ML/MIN/1.73 M^2
EST. GFR  (AFRICAN AMERICAN): >60 ML/MIN/1.73 M^2
EST. GFR  (NON AFRICAN AMERICAN): >60 ML/MIN/1.73 M^2
EST. GFR  (NON AFRICAN AMERICAN): >60 ML/MIN/1.73 M^2
ESTIMATED PA SYSTOLIC PRESSURE: 54.51
GLUCOSE SERPL-MCNC: 150 MG/DL
GLUCOSE SERPL-MCNC: 79 MG/DL
HCT VFR BLD AUTO: 40 %
HGB BLD-MCNC: 12.1 G/DL
IMM GRANULOCYTES # BLD AUTO: 0.02 K/UL
IMM GRANULOCYTES NFR BLD AUTO: 0.4 %
LYMPHOCYTES # BLD AUTO: 1.5 K/UL
LYMPHOCYTES NFR BLD: 28.2 %
MAGNESIUM SERPL-MCNC: 1.6 MG/DL
MAGNESIUM SERPL-MCNC: 1.6 MG/DL
MCH RBC QN AUTO: 26 PG
MCHC RBC AUTO-ENTMCNC: 30.3 G/DL
MCV RBC AUTO: 86 FL
MITRAL VALVE MOBILITY: ABNORMAL
MITRAL VALVE REGURGITATION: ABNORMAL
MONOCYTES # BLD AUTO: 0.7 K/UL
MONOCYTES NFR BLD: 13.8 %
NEUTROPHILS # BLD AUTO: 2.7 K/UL
NEUTROPHILS NFR BLD: 51.5 %
NRBC BLD-RTO: 0 /100 WBC
PLATELET # BLD AUTO: 227 K/UL
PMV BLD AUTO: 9.1 FL
POTASSIUM SERPL-SCNC: 2.9 MMOL/L
POTASSIUM SERPL-SCNC: 3.4 MMOL/L
PROT SERPL-MCNC: 6.6 G/DL
RBC # BLD AUTO: 4.65 M/UL
RETIRED EF AND QEF - SEE NOTES: 10 (ref 55–65)
SODIUM SERPL-SCNC: 137 MMOL/L
SODIUM SERPL-SCNC: 137 MMOL/L
TRICUSPID VALVE REGURGITATION: ABNORMAL
WBC # BLD AUTO: 5.14 K/UL

## 2018-05-22 PROCEDURE — 36415 COLL VENOUS BLD VENIPUNCTURE: CPT | Mod: NTX

## 2018-05-22 PROCEDURE — 93306 TTE W/DOPPLER COMPLETE: CPT | Mod: 26,NTX,, | Performed by: INTERNAL MEDICINE

## 2018-05-22 PROCEDURE — 94761 N-INVAS EAR/PLS OXIMETRY MLT: CPT | Mod: NTX

## 2018-05-22 PROCEDURE — 25000003 PHARM REV CODE 250: Mod: NTX | Performed by: INTERNAL MEDICINE

## 2018-05-22 PROCEDURE — 85025 COMPLETE CBC W/AUTO DIFF WBC: CPT | Mod: NTX

## 2018-05-22 PROCEDURE — 63600175 PHARM REV CODE 636 W HCPCS: Mod: NTX | Performed by: INTERNAL MEDICINE

## 2018-05-22 PROCEDURE — 25000242 PHARM REV CODE 250 ALT 637 W/ HCPCS: Mod: NTX | Performed by: INTERNAL MEDICINE

## 2018-05-22 PROCEDURE — C8929 TTE W OR WO FOL WCON,DOPPLER: HCPCS | Mod: NTX

## 2018-05-22 PROCEDURE — A4216 STERILE WATER/SALINE, 10 ML: HCPCS | Mod: NTX | Performed by: INTERNAL MEDICINE

## 2018-05-22 PROCEDURE — 80053 COMPREHEN METABOLIC PANEL: CPT | Mod: NTX

## 2018-05-22 PROCEDURE — 94640 AIRWAY INHALATION TREATMENT: CPT | Mod: NTX

## 2018-05-22 PROCEDURE — 83735 ASSAY OF MAGNESIUM: CPT | Mod: 91,NTX

## 2018-05-22 PROCEDURE — 99233 SBSQ HOSP IP/OBS HIGH 50: CPT | Mod: NTX,,, | Performed by: INTERNAL MEDICINE

## 2018-05-22 PROCEDURE — 20600001 HC STEP DOWN PRIVATE ROOM: Mod: NTX

## 2018-05-22 PROCEDURE — 80048 BASIC METABOLIC PNL TOTAL CA: CPT | Mod: NTX

## 2018-05-22 RX ORDER — LANOLIN ALCOHOL/MO/W.PET/CERES
800 CREAM (GRAM) TOPICAL DAILY
Status: DISCONTINUED | OUTPATIENT
Start: 2018-05-22 | End: 2018-05-25 | Stop reason: HOSPADM

## 2018-05-22 RX ORDER — POTASSIUM CHLORIDE 20 MEQ/1
40 TABLET, EXTENDED RELEASE ORAL
Status: COMPLETED | OUTPATIENT
Start: 2018-05-22 | End: 2018-05-22

## 2018-05-22 RX ADMIN — TIOTROPIUM BROMIDE 18 MCG: 18 CAPSULE ORAL; RESPIRATORY (INHALATION) at 08:05

## 2018-05-22 RX ADMIN — POTASSIUM CHLORIDE 40 MEQ: 1500 TABLET, EXTENDED RELEASE ORAL at 12:05

## 2018-05-22 RX ADMIN — APIXABAN 5 MG: 5 TABLET, FILM COATED ORAL at 08:05

## 2018-05-22 RX ADMIN — DOCUSATE SODIUM 100 MG: 100 CAPSULE, LIQUID FILLED ORAL at 08:05

## 2018-05-22 RX ADMIN — FUROSEMIDE 10 MG/HR: 10 INJECTION, SOLUTION INTRAMUSCULAR; INTRAVENOUS at 08:05

## 2018-05-22 RX ADMIN — ACETAMINOPHEN 650 MG: 325 TABLET ORAL at 08:05

## 2018-05-22 RX ADMIN — POTASSIUM CHLORIDE 40 MEQ: 1500 TABLET, EXTENDED RELEASE ORAL at 11:05

## 2018-05-22 RX ADMIN — CIPROFLOXACIN HYDROCHLORIDE 500 MG: 500 TABLET, FILM COATED ORAL at 08:05

## 2018-05-22 RX ADMIN — Medication 800 MG: at 11:05

## 2018-05-22 RX ADMIN — ASPIRIN 81 MG: 81 TABLET, COATED ORAL at 08:05

## 2018-05-22 RX ADMIN — IPRATROPIUM BROMIDE AND ALBUTEROL SULFATE 3 ML: .5; 3 SOLUTION RESPIRATORY (INHALATION) at 08:05

## 2018-05-22 RX ADMIN — ALPRAZOLAM 1 MG: 1 TABLET ORAL at 10:05

## 2018-05-22 RX ADMIN — METOPROLOL SUCCINATE 12.5 MG: 25 TABLET, EXTENDED RELEASE ORAL at 08:05

## 2018-05-22 RX ADMIN — IPRATROPIUM BROMIDE AND ALBUTEROL SULFATE 3 ML: .5; 3 SOLUTION RESPIRATORY (INHALATION) at 02:05

## 2018-05-22 RX ADMIN — AMOXICILLIN AND CLAVULANATE POTASSIUM 1 TABLET: 875; 125 TABLET, FILM COATED ORAL at 08:05

## 2018-05-22 RX ADMIN — POTASSIUM CHLORIDE 40 MEQ: 1500 TABLET, EXTENDED RELEASE ORAL at 08:05

## 2018-05-22 RX ADMIN — SODIUM CHLORIDE, PRESERVATIVE FREE 3 ML: 5 INJECTION INTRAVENOUS at 08:05

## 2018-05-22 RX ADMIN — SODIUM CHLORIDE, PRESERVATIVE FREE 3 ML: 5 INJECTION INTRAVENOUS at 06:05

## 2018-05-22 RX ADMIN — ROSUVASTATIN CALCIUM 40 MG: 20 TABLET, FILM COATED ORAL at 08:05

## 2018-05-22 RX ADMIN — FUROSEMIDE 10 MG/HR: 10 INJECTION, SOLUTION INTRAMUSCULAR; INTRAVENOUS at 10:05

## 2018-05-22 RX ADMIN — ALPRAZOLAM 1 MG: 1 TABLET ORAL at 08:05

## 2018-05-22 RX ADMIN — PANTOPRAZOLE SODIUM 40 MG: 40 TABLET, DELAYED RELEASE ORAL at 08:05

## 2018-05-22 RX ADMIN — ACETAMINOPHEN 650 MG: 325 TABLET ORAL at 10:05

## 2018-05-22 NOTE — ASSESSMENT & PLAN NOTE
- Continue apixaban 5mg BID  - Will repeat CT scan of chest with IV fluid tomorrow to evaluate for residual PE / CTEPH   - Repeat TTE ordered to evaluate RV function and give estimate for PA pressures

## 2018-05-22 NOTE — SUBJECTIVE & OBJECTIVE
Interval History: No acute issues overnight. More awake this AM. Diuresed well (negative 3.7Ls) overnight. Discussed possibility of cardio MEMS with patient. Denies illicit drug use (stopped 3-4 years ago) and hasn't drank any alcohol since January of this year.     Continuous Infusions:   furosemide (LASIX) 2 mg/mL infusion (non-titrating) 10 mg/hr (05/22/18 0823)     Scheduled Meds:   albuterol-ipratropium  3 mL Nebulization Q6H WAKE    amoxicillin-clavulanate 875-125mg  1 tablet Oral Q12H    apixaban  5 mg Oral BID    aspirin  81 mg Oral Daily    ciprofloxacin HCl  500 mg Oral Q12H    docusate sodium  100 mg Oral BID    magnesium oxide  800 mg Oral Daily    metoprolol succinate  12.5 mg Oral Daily    pantoprazole  40 mg Oral Daily    potassium chloride SA  40 mEq Oral Daily    potassium chloride  40 mEq Oral Q1H    rosuvastatin  40 mg Oral Daily    sodium chloride 0.9%  3 mL Intravenous Q8H    tiotropium  1 capsule Inhalation Daily     PRN Meds:acetaminophen, ALPRAZolam, benzonatate    Review of patient's allergies indicates:   Allergen Reactions    Indomethacin      Objective:     Vital Signs (Most Recent):  Temp: 97.8 °F (36.6 °C) (05/22/18 0818)  Pulse: 107 (05/22/18 1046)  Resp: 16 (05/22/18 0818)  BP: 90/62 (05/22/18 0818)  SpO2: 95 % (05/22/18 0818) Vital Signs (24h Range):  Temp:  [96.6 °F (35.9 °C)-97.8 °F (36.6 °C)] 97.8 °F (36.6 °C)  Pulse:  [] 107  Resp:  [13-20] 16  SpO2:  [95 %-100 %] 95 %  BP: ()/(62-88) 90/62   Patient Vitals for the past 72 hrs (Last 3 readings):   Weight   05/21/18 1419 93.4 kg (206 lb)   05/21/18 1200 93.6 kg (206 lb 5.6 oz)     Body mass index is 28.73 kg/m².    Intake/Output Summary (Last 24 hours) at 05/22/18 1104  Last data filed at 05/22/18 0900   Gross per 24 hour   Intake              750 ml   Output             5002 ml   Net            -4252 ml     Physical Exam  Gen: NAD. Resting comfortably in bed  HEENT: NC/AT. PERRL  Neck: JVD elevated 6cm  "above clavicle. Supple  Heart: Regular with mild tachycardia. +S3 heena. No murmrus heard  Lungs: Scant bibasilar rales with good air movement.   Abd: Soft. Non tender. Mild distention with no fluid wave. Normal BS.   Ext: Bilateral LE edema. Warm distally  Neuro: AAO x 3. NO focal deficits   Vitals reviewed    Significant Labs:  CBC:    Recent Labs  Lab 05/16/18  0617 05/21/18  0716 05/22/18  0550   WBC 5.50 5.14 5.14   RBC 4.76 4.58* 4.65   HGB 13.1* 12.0* 12.1*   HCT 40.7 40.4 40.0    252 227   MCV 86 88 86   MCH 27.6 26.2* 26.0*   MCHC 32.2 29.7* 30.3*     BNP:    Recent Labs  Lab 05/21/18  0716   BNP 3,094*     CMP:    Recent Labs  Lab 05/16/18  0617 05/21/18  0716 05/22/18  0550   GLU 88 62* 79   CALCIUM 9.5 9.7 9.2   ALBUMIN  --  3.1* 3.1*   PROT  --  6.8 6.6   * 139 137   K 4.5 3.1* 2.9*   CO2 28 29 26   CL 96 100 98   BUN 18 18 17   CREATININE 0.80 1.0 0.9   ALKPHOS  --  175* 145*   ALT  --  24 22   AST  --  29 26   BILITOT  --  0.9 1.1*      I have reviewed all pertinent labs within the past 24 hours.    Estimated Creatinine Clearance: 112 mL/min (based on SCr of 0.9 mg/dL).    Diagnostic Results:  CXR: X-ray Chest Pa And Lateral: "Pacemaker identified.  Mild cardiomegaly.  Ill-defined opacification identified at the right lung base posteriorly consistent with airspace consolidation or atelectasis.  Small amount of right-sided pleural effusion also noted.  The left lung is clear"  "

## 2018-05-22 NOTE — PROGRESS NOTES
[facility]  Heart Transplant  Progress Note    Patient Name: Da Cruz  MRN: [id number]  Admission Date: 5/21/2018  Hospital Length of Stay: 1 days  Attending Physician: João Kessler MD  Primary Care Provider: Pool Zambrano Iii, MD  Principal Problem:Acute on chronic combined systolic and diastolic heart failure    Subjective:     Interval History: No acute issues overnight. More awake this AM. Diuresed well (negative 3.7Ls) overnight. Discussed possibility of cardio MEMS with patient. Denies illicit drug use (stopped 3-4 years ago) and hasn't drank any alcohol since January of this year.     Continuous Infusions:   furosemide (LASIX) 2 mg/mL infusion (non-titrating) 10 mg/hr (05/22/18 0823)     Scheduled Meds:   albuterol-ipratropium  3 mL Nebulization Q6H WAKE    amoxicillin-clavulanate 875-125mg  1 tablet Oral Q12H    apixaban  5 mg Oral BID    aspirin  81 mg Oral Daily    ciprofloxacin HCl  500 mg Oral Q12H    docusate sodium  100 mg Oral BID    magnesium oxide  800 mg Oral Daily    metoprolol succinate  12.5 mg Oral Daily    pantoprazole  40 mg Oral Daily    potassium chloride SA  40 mEq Oral Daily    potassium chloride  40 mEq Oral Q1H    rosuvastatin  40 mg Oral Daily    sodium chloride 0.9%  3 mL Intravenous Q8H    tiotropium  1 capsule Inhalation Daily     PRN Meds:acetaminophen, ALPRAZolam, benzonatate    Review of patient's allergies indicates:   Allergen Reactions    Indomethacin      Objective:     Vital Signs (Most Recent):  Temp: 97.8 °F (36.6 °C) (05/22/18 0818)  Pulse: 107 (05/22/18 1046)  Resp: 16 (05/22/18 0818)  BP: 90/62 (05/22/18 0818)  SpO2: 95 % (05/22/18 0818) Vital Signs (24h Range):  Temp:  [96.6 °F (35.9 °C)-97.8 °F (36.6 °C)] 97.8 °F (36.6 °C)  Pulse:  [] 107  Resp:  [13-20] 16  SpO2:  [95 %-100 %] 95 %  BP: ()/(62-88) 90/62   Patient Vitals for the past 72 hrs (Last 3 readings):   Weight   05/21/18 1419 93.4 kg (206 lb)  "  05/21/18 1200 93.6 kg (206 lb 5.6 oz)     Body mass index is 28.73 kg/m².    Intake/Output Summary (Last 24 hours) at 05/22/18 1104  Last data filed at 05/22/18 0900   Gross per 24 hour   Intake              750 ml   Output             5002 ml   Net            -4252 ml     Physical Exam  Gen: NAD. Resting comfortably in bed  HEENT: NC/AT. PERRL  Neck: JVD elevated 6cm above clavicle. Supple  Heart: Regular with mild tachycardia. +S3 heena. No murmrus heard  Lungs: Scant bibasilar rales with good air movement.   Abd: Soft. Non tender. Mild distention with no fluid wave. Normal BS.   Ext: Bilateral LE edema. Warm distally  Neuro: AAO x 3. NO focal deficits   Vitals reviewed    Significant Labs:  CBC:    Recent Labs  Lab 05/16/18  0617 05/21/18  0716 05/22/18  0550   WBC 5.50 5.14 5.14   RBC 4.76 4.58* 4.65   HGB 13.1* 12.0* 12.1*   HCT 40.7 40.4 40.0    252 227   MCV 86 88 86   MCH 27.6 26.2* 26.0*   MCHC 32.2 29.7* 30.3*     BNP:    Recent Labs  Lab 05/21/18  0716   BNP 3,094*     CMP:    Recent Labs  Lab 05/16/18  0617 05/21/18  0716 05/22/18  0550   GLU 88 62* 79   CALCIUM 9.5 9.7 9.2   ALBUMIN  --  3.1* 3.1*   PROT  --  6.8 6.6   * 139 137   K 4.5 3.1* 2.9*   CO2 28 29 26   CL 96 100 98   BUN 18 18 17   CREATININE 0.80 1.0 0.9   ALKPHOS  --  175* 145*   ALT  --  24 22   AST  --  29 26   BILITOT  --  0.9 1.1*      I have reviewed all pertinent labs within the past 24 hours.    Estimated Creatinine Clearance: 112 mL/min (based on SCr of 0.9 mg/dL).    Diagnostic Results:  CXR: X-ray Chest Pa And Lateral: "Pacemaker identified.  Mild cardiomegaly.  Ill-defined opacification identified at the right lung base posteriorly consistent with airspace consolidation or atelectasis.  Small amount of right-sided pleural effusion also noted.  The left lung is clear"    Assessment and Plan:     * Acute on chronic combined systolic and diastolic heart failure    - Etiology: HFrEF 2/2 ICM with possible concomitant " CTEPH s/p PE earlier this year with residual clots seen on follow up CT scans   - Wet / Warm on exam   - Continue Lasix gtt at 10/hr   - Net negative 3.5Ls over past 24 hours   - Aggressively replace lytes (K>4 and Mg >2)  - GDMT: Continue low dose metoprolol XL for now   - Strict I/Os and daily weights  - Repeat TTE pending         Pulmonary embolism    - Continue apixaban 5mg BID  - Will repeat CT scan of chest with IV fluid tomorrow to evaluate for residual PE / CTEPH   - Repeat TTE ordered to evaluate RV function and give estimate for PA pressures           Coronary artery disease involving native coronary artery    - Continue ASA / Statin         Empyema lung    - s/p IR drainage earlier this year  - Continue PO ABX (cipro / augmentin) for now (6 week course)           Anxiety    - Continue low dose ativan TID PRN               Reed lees, DO  Heart Transplant  [facility]

## 2018-05-22 NOTE — ASSESSMENT & PLAN NOTE
- s/p IR drainage earlier this year  - Continue PO ABX (cipro / augmentin) for now (6 week course)

## 2018-05-22 NOTE — PROGRESS NOTES
Admit Note     Met with patient to assess needs. Patient is a 53 y.o.  male, admitted for Acute decompensated heart failure [I50.9].    Patient admitted from home on 5/21/2018.  At this time, patient presents as alert and oriented x 4, calm, cooperative and asking and answering questions appropriately.  At this time, patients caregiver presents as asleep in room.    Household/Family Systems     Patient resides with patient's mother at:     [address] [address]     Support system includes mother and adult children.  Patient does not have dependents that are need of being cared for.     Patients primary caregiver is Lianet Cruz (Elizabeth), patients mother, phone number [phone].  Confirmed patients contact information is [phone] (home);   Telephone Information:   Mobile [phone]       During admission, patient's caregiver plans to stay in patient's room.  Confirmed patient and patients caregivers do have access to reliable transportation. Pt reports his son dropped him off at the hospital, and his son will also provide transport home.     Cognitive Status/Learning     Patient reports reading ability as 9th grade and states patient does have difficulty with seeing and memory. Pt reports needing glasses.  Patient reports patient learns best by hands on.   Needed: No.   Highest education level: High School (9-12) or GED. Pt has GED.    Vocation/Disability     Working for Income: No  If no, reason not working: Disability  Patient is disabled due to CHF since 2010.  Prior to disability, patient  was employed as a .    Adherence     Patient reports a high level of adherence to patients health care regimen.  Adherence counseling and education provided. Patient verbalizes understanding.    Substance Use    Patient reports the following substance usage.    Tobacco: Pt reports smoking 1 ppd until approximatly 2 months ago. Pt reports he stopped smoking daily 2 months  "ago, and now only smokes "every now and then."  Alcohol: Pt reports he stopped all ETOH use in January. Pt reports prior to January, pt was drinking approximately 1 case of beer a month.  Illicit Drugs/Non-prescribed Medications: Pt reports no current use. Pt reports smoking marijuana daily until .  Patient states clear understanding of the potential impact of substance use.  Substance abstinence/cessation counseling, education and resources provided and reviewed.     Services Utilizing/ADLS    Infusion Service: Prior to admission, patient utilizing? no  Home Health: Prior to admission, patient utilizing? no  DME: Prior to admission, no  Pulmonary/Cardiac Rehab: Prior to admission, no  Dialysis:  Prior to admission, no  Transplant Specialty Pharmacy:  Prior to admission, no.    Prior to admission, patient reports patient was mostly independent with ADLS and was not driving. Pt reports friends and family assist with transportation when needed. Patient reports patient is not able to care for self at this time due to compromised medical condition (as documented in medical record) and physical weakness.  Patient indicates a willingness to care for self once medically cleared to do so.    Insurance/Medications    Insured by   Payor/Plan Subscr  Sex Relation Sub. Ins. ID Effective Group Num   1. [employer]* VERONICA BONILLA MA* 1965 Male Self [id number]                                   [address]      Primary Insurance (for UNOS reporting): Public Insurance - Medicare & Choice  Secondary Insurance (for UNOS reporting): None    Patient reports patient is able to obtain and afford medications at this time and at time of discharge.    Living Will/Healthcare Power of     Patient states patient does not have a LW and/or HCPA.   provided education regarding LW and HCPA and the completion of forms.    Coping/Mental Health    Patient is coping adequately with the aid " of  support from mother and Xanax.  Patient indicates mental health difficulties. Pt reports a history of anxiety which is managed by a prescription for Xanax from his PCP.     Discharge Planning    At time of discharge, patient plans to return to patient's home under the care of self and mother.  Patients son will transport patient.  Per rounds today, expected discharge date has not been medically determined at this time. Patient and patients caregiver  verbalize understanding and are involved in treatment planning and discharge process.    Additional Concerns    Patient is being followed for needs, education, resources, information, emotional support, supportive counseling, and for supportive and skilled discharge plan of care.  providing ongoing psychosocial support, education, resources and d/c planning as needed.  SW remains available. Patient denies additional needs and/or concerns at this time. Patient verbalizes understanding and agreement with information reviewed, social work availability, and how to access available resources as needed.

## 2018-05-22 NOTE — PLAN OF CARE
Problem: Patient Care Overview  Goal: Plan of Care Review  Outcome: Ongoing (interventions implemented as appropriate)  Pt free of falls/traumas/injuries. Skin remains clean, dry, and intact.  Pt continued on lasix gtt at prescribed rate. Pt s/p echo. Pt re-educated on importance of measuring accurate intake and out put; pt verbalized and demonstrates understanding. Reviewed plan of care with pt; and pt verbalized understanding.  Pt VSS in no distress will continue to monitor.

## 2018-05-22 NOTE — ASSESSMENT & PLAN NOTE
- Etiology: HFrEF 2/2 ICM with possible concomitant CTEPH s/p PE earlier this year with residual clots seen on follow up CT scans   - Wet / Warm on exam   - Continue Lasix gtt at 10/hr   - Net negative 3.5Ls over past 24 hours   - Aggressively replace lytes (K>4 and Mg >2)  - GDMT: Continue low dose metoprolol XL for now   - Strict I/Os and daily weights  - Repeat TTE pending

## 2018-05-22 NOTE — PLAN OF CARE
Problem: Patient Care Overview  Goal: Individualization & Mutuality  Outcome: Ongoing (interventions implemented as appropriate)  Patient will remain free of falls & injury. Discussed plan of care with patient and family at the bedside, both verbalized understanding. Lasix drip @ 10mg/hr=5ml and discussed in details the importance of measuring urine output & using the urinal at the bedside. We also discussed in detail intake (fluid restrictions and why). Patient on Eliquis PO. Will continue to monitor

## 2018-05-23 DIAGNOSIS — I50.9 CONGESTIVE HEART FAILURE, UNSPECIFIED HF CHRONICITY, UNSPECIFIED HEART FAILURE TYPE: Primary | ICD-10-CM

## 2018-05-23 LAB
ALBUMIN SERPL BCP-MCNC: 3.3 G/DL
ALP SERPL-CCNC: 147 U/L
ALT SERPL W/O P-5'-P-CCNC: 20 U/L
ANION GAP SERPL CALC-SCNC: 11 MMOL/L
ANION GAP SERPL CALC-SCNC: 9 MMOL/L
AST SERPL-CCNC: 22 U/L
BASOPHILS # BLD AUTO: 0.07 K/UL
BASOPHILS NFR BLD: 1.2 %
BILIRUB SERPL-MCNC: 1.4 MG/DL
BUN SERPL-MCNC: 17 MG/DL
BUN SERPL-MCNC: 18 MG/DL
CALCIUM SERPL-MCNC: 9.7 MG/DL
CALCIUM SERPL-MCNC: 9.7 MG/DL
CHLORIDE SERPL-SCNC: 93 MMOL/L
CHLORIDE SERPL-SCNC: 96 MMOL/L
CO2 SERPL-SCNC: 29 MMOL/L
CO2 SERPL-SCNC: 35 MMOL/L
CREAT SERPL-MCNC: 0.9 MG/DL
CREAT SERPL-MCNC: 0.9 MG/DL
DIFFERENTIAL METHOD: ABNORMAL
EOSINOPHIL # BLD AUTO: 0.2 K/UL
EOSINOPHIL NFR BLD: 2.7 %
ERYTHROCYTE [DISTWIDTH] IN BLOOD BY AUTOMATED COUNT: 19.5 %
EST. GFR  (AFRICAN AMERICAN): >60 ML/MIN/1.73 M^2
EST. GFR  (AFRICAN AMERICAN): >60 ML/MIN/1.73 M^2
EST. GFR  (NON AFRICAN AMERICAN): >60 ML/MIN/1.73 M^2
EST. GFR  (NON AFRICAN AMERICAN): >60 ML/MIN/1.73 M^2
GLUCOSE SERPL-MCNC: 107 MG/DL
GLUCOSE SERPL-MCNC: 99 MG/DL
HCT VFR BLD AUTO: 39.8 %
HGB BLD-MCNC: 12.3 G/DL
IMM GRANULOCYTES # BLD AUTO: 0.02 K/UL
IMM GRANULOCYTES NFR BLD AUTO: 0.3 %
LYMPHOCYTES # BLD AUTO: 1.8 K/UL
LYMPHOCYTES NFR BLD: 30.3 %
MAGNESIUM SERPL-MCNC: 1.6 MG/DL
MCH RBC QN AUTO: 26.3 PG
MCHC RBC AUTO-ENTMCNC: 30.9 G/DL
MCV RBC AUTO: 85 FL
MONOCYTES # BLD AUTO: 0.7 K/UL
MONOCYTES NFR BLD: 11.6 %
NEUTROPHILS # BLD AUTO: 3.2 K/UL
NEUTROPHILS NFR BLD: 53.9 %
NRBC BLD-RTO: 0 /100 WBC
PLATELET # BLD AUTO: 224 K/UL
PMV BLD AUTO: 9 FL
POTASSIUM SERPL-SCNC: 3 MMOL/L
POTASSIUM SERPL-SCNC: 3 MMOL/L
PROT SERPL-MCNC: 6.8 G/DL
RBC # BLD AUTO: 4.68 M/UL
SODIUM SERPL-SCNC: 136 MMOL/L
SODIUM SERPL-SCNC: 137 MMOL/L
WBC # BLD AUTO: 5.88 K/UL

## 2018-05-23 PROCEDURE — 25000003 PHARM REV CODE 250: Mod: NTX | Performed by: INTERNAL MEDICINE

## 2018-05-23 PROCEDURE — 27000646 HC AEROBIKA DEVICE: Mod: NTX

## 2018-05-23 PROCEDURE — 20600001 HC STEP DOWN PRIVATE ROOM: Mod: NTX

## 2018-05-23 PROCEDURE — 99900035 HC TECH TIME PER 15 MIN (STAT): Mod: NTX

## 2018-05-23 PROCEDURE — 83735 ASSAY OF MAGNESIUM: CPT | Mod: NTX

## 2018-05-23 PROCEDURE — 99233 SBSQ HOSP IP/OBS HIGH 50: CPT | Mod: NTX,,, | Performed by: INTERNAL MEDICINE

## 2018-05-23 PROCEDURE — 80048 BASIC METABOLIC PNL TOTAL CA: CPT | Mod: NTX

## 2018-05-23 PROCEDURE — 85025 COMPLETE CBC W/AUTO DIFF WBC: CPT | Mod: NTX

## 2018-05-23 PROCEDURE — 94640 AIRWAY INHALATION TREATMENT: CPT | Mod: NTX

## 2018-05-23 PROCEDURE — 25500020 PHARM REV CODE 255: Mod: NTX | Performed by: INTERNAL MEDICINE

## 2018-05-23 PROCEDURE — 25000242 PHARM REV CODE 250 ALT 637 W/ HCPCS: Mod: NTX | Performed by: INTERNAL MEDICINE

## 2018-05-23 PROCEDURE — 94761 N-INVAS EAR/PLS OXIMETRY MLT: CPT | Mod: NTX

## 2018-05-23 PROCEDURE — 80053 COMPREHEN METABOLIC PANEL: CPT | Mod: NTX

## 2018-05-23 PROCEDURE — 94664 DEMO&/EVAL PT USE INHALER: CPT | Mod: NTX

## 2018-05-23 PROCEDURE — 63600175 PHARM REV CODE 636 W HCPCS: Mod: NTX | Performed by: INTERNAL MEDICINE

## 2018-05-23 PROCEDURE — A4216 STERILE WATER/SALINE, 10 ML: HCPCS | Mod: NTX | Performed by: INTERNAL MEDICINE

## 2018-05-23 PROCEDURE — 36415 COLL VENOUS BLD VENIPUNCTURE: CPT | Mod: NTX

## 2018-05-23 PROCEDURE — 99222 1ST HOSP IP/OBS MODERATE 55: CPT | Mod: GC,NTX,, | Performed by: INTERNAL MEDICINE

## 2018-05-23 RX ORDER — POTASSIUM CHLORIDE 20 MEQ/1
40 TABLET, EXTENDED RELEASE ORAL 2 TIMES DAILY
Status: DISCONTINUED | OUTPATIENT
Start: 2018-05-23 | End: 2018-05-25 | Stop reason: HOSPADM

## 2018-05-23 RX ORDER — POTASSIUM CHLORIDE 20 MEQ/1
20 TABLET, EXTENDED RELEASE ORAL ONCE
Status: COMPLETED | OUTPATIENT
Start: 2018-05-23 | End: 2018-05-23

## 2018-05-23 RX ORDER — LANOLIN ALCOHOL/MO/W.PET/CERES
800 CREAM (GRAM) TOPICAL ONCE
Status: COMPLETED | OUTPATIENT
Start: 2018-05-23 | End: 2018-05-23

## 2018-05-23 RX ORDER — FUROSEMIDE 10 MG/ML
80 INJECTION INTRAMUSCULAR; INTRAVENOUS ONCE
Status: COMPLETED | OUTPATIENT
Start: 2018-05-23 | End: 2018-05-23

## 2018-05-23 RX ORDER — POTASSIUM CHLORIDE 20 MEQ/1
40 TABLET, EXTENDED RELEASE ORAL ONCE
Status: COMPLETED | OUTPATIENT
Start: 2018-05-23 | End: 2018-05-23

## 2018-05-23 RX ORDER — RAMELTEON 8 MG/1
8 TABLET ORAL NIGHTLY PRN
Status: DISCONTINUED | OUTPATIENT
Start: 2018-05-23 | End: 2018-05-25 | Stop reason: HOSPADM

## 2018-05-23 RX ADMIN — DOCUSATE SODIUM 100 MG: 100 CAPSULE, LIQUID FILLED ORAL at 08:05

## 2018-05-23 RX ADMIN — POTASSIUM CHLORIDE 20 MEQ: 1500 TABLET, EXTENDED RELEASE ORAL at 01:05

## 2018-05-23 RX ADMIN — RAMELTEON 8 MG: 8 TABLET, FILM COATED ORAL at 08:05

## 2018-05-23 RX ADMIN — TIOTROPIUM BROMIDE 18 MCG: 18 CAPSULE ORAL; RESPIRATORY (INHALATION) at 08:05

## 2018-05-23 RX ADMIN — ASPIRIN 81 MG: 81 TABLET, COATED ORAL at 08:05

## 2018-05-23 RX ADMIN — POTASSIUM CHLORIDE 40 MEQ: 1500 TABLET, EXTENDED RELEASE ORAL at 08:05

## 2018-05-23 RX ADMIN — PANTOPRAZOLE SODIUM 40 MG: 40 TABLET, DELAYED RELEASE ORAL at 08:05

## 2018-05-23 RX ADMIN — CIPROFLOXACIN HYDROCHLORIDE 500 MG: 500 TABLET, FILM COATED ORAL at 08:05

## 2018-05-23 RX ADMIN — POTASSIUM CHLORIDE 40 MEQ: 1500 TABLET, EXTENDED RELEASE ORAL at 11:05

## 2018-05-23 RX ADMIN — IPRATROPIUM BROMIDE AND ALBUTEROL SULFATE 3 ML: .5; 3 SOLUTION RESPIRATORY (INHALATION) at 08:05

## 2018-05-23 RX ADMIN — ALPRAZOLAM 1 MG: 1 TABLET ORAL at 11:05

## 2018-05-23 RX ADMIN — ACETAMINOPHEN 650 MG: 325 TABLET ORAL at 11:05

## 2018-05-23 RX ADMIN — ACETAMINOPHEN 650 MG: 325 TABLET ORAL at 08:05

## 2018-05-23 RX ADMIN — FUROSEMIDE 80 MG: 10 INJECTION, SOLUTION INTRAMUSCULAR; INTRAVENOUS at 11:05

## 2018-05-23 RX ADMIN — ALPRAZOLAM 1 MG: 1 TABLET ORAL at 08:05

## 2018-05-23 RX ADMIN — FUROSEMIDE 20 MG/HR: 10 INJECTION, SOLUTION INTRAMUSCULAR; INTRAVENOUS at 04:05

## 2018-05-23 RX ADMIN — APIXABAN 5 MG: 5 TABLET, FILM COATED ORAL at 08:05

## 2018-05-23 RX ADMIN — METOPROLOL SUCCINATE 12.5 MG: 25 TABLET, EXTENDED RELEASE ORAL at 08:05

## 2018-05-23 RX ADMIN — AMOXICILLIN AND CLAVULANATE POTASSIUM 1 TABLET: 875; 125 TABLET, FILM COATED ORAL at 08:05

## 2018-05-23 RX ADMIN — ROSUVASTATIN CALCIUM 40 MG: 20 TABLET, FILM COATED ORAL at 08:05

## 2018-05-23 RX ADMIN — SODIUM CHLORIDE, PRESERVATIVE FREE 3 ML: 5 INJECTION INTRAVENOUS at 08:05

## 2018-05-23 RX ADMIN — Medication 800 MG: at 08:05

## 2018-05-23 RX ADMIN — IPRATROPIUM BROMIDE AND ALBUTEROL SULFATE 3 ML: .5; 3 SOLUTION RESPIRATORY (INHALATION) at 07:05

## 2018-05-23 RX ADMIN — Medication 800 MG: at 01:05

## 2018-05-23 RX ADMIN — IOHEXOL 75 ML: 350 INJECTION, SOLUTION INTRAVENOUS at 12:05

## 2018-05-23 RX ADMIN — IPRATROPIUM BROMIDE AND ALBUTEROL SULFATE 3 ML: .5; 3 SOLUTION RESPIRATORY (INHALATION) at 01:05

## 2018-05-23 NOTE — PROGRESS NOTES
[facility]  Heart Transplant  Progress Note    Patient Name: Da Cruz  MRN: [id number]  Admission Date: 5/21/2018  Hospital Length of Stay: 2 days  Attending Physician: João Kessler MD  Primary Care Provider: Pool Zambrano Iii, MD  Principal Problem:Acute on chronic combined systolic and diastolic heart failure    Subjective:     Interval History: Continues to do well. No acute issues overnight.     Continuous Infusions:   furosemide (LASIX) 2 mg/mL infusion (non-titrating) 10 mg/hr (05/22/18 2248)     Scheduled Meds:   albuterol-ipratropium  3 mL Nebulization Q6H WAKE    amoxicillin-clavulanate 875-125mg  1 tablet Oral Q12H    apixaban  5 mg Oral BID    aspirin  81 mg Oral Daily    ciprofloxacin HCl  500 mg Oral Q12H    docusate sodium  100 mg Oral BID    magnesium oxide  800 mg Oral Daily    metoprolol succinate  12.5 mg Oral Daily    pantoprazole  40 mg Oral Daily    potassium chloride SA  40 mEq Oral Daily    rosuvastatin  40 mg Oral Daily    sodium chloride 0.9%  3 mL Intravenous Q8H    tiotropium  1 capsule Inhalation Daily     PRN Meds:acetaminophen, ALPRAZolam, benzonatate    Review of patient's allergies indicates:   Allergen Reactions    Indomethacin      Objective:     Vital Signs (Most Recent):  Temp: 97.5 °F (36.4 °C) (05/23/18 0536)  Pulse: (!) 50 (05/23/18 0536)  Resp: 17 (05/23/18 0358)  BP: 115/70 (05/23/18 0536)  SpO2: 96 % (05/23/18 0536) Vital Signs (24h Range):  Temp:  [97.5 °F (36.4 °C)-97.8 °F (36.6 °C)] 97.5 °F (36.4 °C)  Pulse:  [] 50  Resp:  [16-20] 17  SpO2:  [94 %-99 %] 96 %  BP: ()/(59-74) 115/70     Patient Vitals for the past 72 hrs (Last 3 readings):   Weight   05/21/18 1419 93.4 kg (206 lb)   05/21/18 1200 93.6 kg (206 lb 5.6 oz)     Body mass index is 28.73 kg/m².    Intake/Output Summary (Last 24 hours) at 05/23/18 0710  Last data filed at 05/23/18 0600   Gross per 24 hour   Intake             2085 ml   Output             " 3777 ml   Net            -1692 ml     Physical Exam    Gen: NAD. Resting comfortably in bed  HEENT: NC/AT. PERRL  Neck: JVD elevated 6cm above clavicle. Supple  Heart: Regular with mild tachycardia. +S3 heena. No murmrus heard  Lungs: Scant bibasilar rales with good air movement.   Abd: Soft. Non tender. Mild distention with no fluid wave. Normal BS.   Ext: Bilateral LE edema. Warm distally  Neuro: AAO x 3. NO focal deficits   Vitals reviewed    Significant Labs:  CBC:    Recent Labs  Lab 05/21/18  0716 05/22/18  0550   WBC 5.14 5.14   RBC 4.58* 4.65   HGB 12.0* 12.1*   HCT 40.4 40.0    227   MCV 88 86   MCH 26.2* 26.0*   MCHC 29.7* 30.3*     BNP:    Recent Labs  Lab 05/21/18  0716   BNP 3,094*     CMP:    Recent Labs  Lab 05/21/18  0716 05/22/18  0550 05/22/18  1327 05/23/18  0558   GLU 62* 79 150* 99   CALCIUM 9.7 9.2 9.6 9.7   ALBUMIN 3.1* 3.1*  --  3.3*   PROT 6.8 6.6  --  6.8    137 137 136   K 3.1* 2.9* 3.4* 3.0*   CO2 29 26 29 29    98 98 96   BUN 18 17 18 18   CREATININE 1.0 0.9 1.0 0.9   ALKPHOS 175* 145*  --  147*   ALT 24 22  --  20   AST 29 26  --  22   BILITOT 0.9 1.1*  --  1.4*      I have reviewed all pertinent labs within the past 24 hours.    Estimated Creatinine Clearance: 112 mL/min (based on SCr of 0.9 mg/dL).    Diagnostic Results:  CXR: X-ray Chest Pa And Lateral: "Pacemaker identified.  Mild cardiomegaly.  Ill-defined opacification identified at the right lung base posteriorly consistent with airspace consolidation or atelectasis.  Small amount of right-sided pleural effusion also noted.  The left lung is clear"    Assessment and Plan:     "Mr. Cruz is a 53 y.o. year old White male who has presents to be considered for advanced surgical options (LVAD/OHT).      Patient presents after several admissions for various issues including ADHF and infection since January of this year. He states he has had ICM, s/p LAD and RCA PCI following MI in 2009, after which he initially " did quite well, however the last 7 months or so has had dramatic decline in how he is doing. Was smoking and drinking (not drinking very heavily at all), but smoking up until January of this year. Still has cigarettes once in a while, however not recently. No ETOH at all anymore. January had an admisson for ADHF, was subsequently discharged home. February developed unilateral leg swelling of left LE, with subsequently described bilateral submassive pulmonary embolism (CTscan of chest with IV contrast last taken 05/07/18 at [facility] demonstrates residual PE in right middle and right lower pulmonary artery). He was placed on eliquis after this. Subsequently had admission to [facility] for MRSA pneumonia, appears from notes that he left AMA without completing treatment, patient states that he actually has left 3 different hospitals AMA due to not feeling he was getting appropriate/adequate care, and he feels he has continued to decline. Got into a little argument with his mother about this at the time he told me this.      He is coughing a significant amount, increased since the most recent admission he had at [facility], left the hospital 05/16/18, where he was admitted for 3 days for extreme hypotension and what patient describes as hypovolemia. He was admitted to the ICU with hypotension of SBP to 60s, he had recently been seen in clinic where his systolics had been stable in the 80s. He was started on IV levophed, had significant wheezing, CT chest with suspicion for empyema and/or lung abscess, unchanged, opacities in left lingula unchanged as well. Appears uncertain that he received significant fluids in hospital, however did have lasix held until he could follow up with us, was started on metoprolol 12.5mg po daily as well, and per pulmonary was recommended 1 month of oral abx treatment with no indication for VATS.       Additionally he has increased abdominal swelling since discharge, although he had  "his mother state he has had significantly more abdominal swelling in the past.      CT Chest non-contrast: 05/14/18:  1. The drainage catheter is been removed  2. Right basilar pleural collection and lung consolidation suspicious for empyema and/or lung abscess unchanged  3. Opacities left lingula unchanged with nothing new     Most recent CTA of his chest appears to be from 05/04/18 at [facility] in care everywhere:  Outside CT scan without a report is available dated April 19, 2018. Patient also has a history of lung abscess and pulmonary embolus.  Study is degraded by motion artifact. Volume loss and scarring of the left lung base is identified. There is a cavitary lesion at the right lung base which appears to be decreased in size when compared to the prior study. This lesion currently measures 2.8 cm in greatest dimension compared to prior measurement of 4.7 cm. There is adjacent infiltration with pleural reaction. The bony structures demonstrate degenerative changes but nothing acute.  There is persistent thrombus within the right lower lobe pulmonary artery and possibly within the left upper lobe pulmonary artery. There is considerable contrast material within the inferior vena cava and right heart consistent with right heart strain. There is left atrial enlargement.     03/01/18 TTE (Dr. Nicholas):  LVEDD 7cm, LVEF 10%  TR mild-mod, peak TV gradient 21 mm Hg.  MR mod-severe 3+  AV tricuspid"    * Acute on chronic combined systolic and diastolic heart failure    - Etiology: HFrEF 2/2 ICM with possible concomitant CTEPH s/p PE earlier this year with residual clots seen on follow up CT scans   - CT chest with contrast this AM to evaluate for PE   - Wet / Warm on exam   - Continue Lasix gtt at 10/hr   - Net negative 1.9Ls over past 24 hours   - Aggressively replace lytes (K>4 and Mg >2)  - GDMT: Continue low dose metoprolol XL for now   - Strict I/Os and daily weights  - TTE reviewed.         Pulmonary embolism    - " Continue apixaban 5mg BID  - CT chest today.   - TTE reveiwed.           Coronary artery disease involving native coronary artery    - Continue ASA / Statin         Empyema lung    - s/p IR drainage earlier this year  - Continue PO ABX (cipro / augmentin) for now (6 week course)           Anxiety    - Continue low dose ativan TID PRN               Reed lees, DO  Heart Transplant  [facility]

## 2018-05-23 NOTE — ASSESSMENT & PLAN NOTE
This is . Da Cruz, 53 year old male with PMHx significant for CAD S/P coronary artery stent placement resulted in HFrEF 10-15%, multiple pulmonary emboli. ID consulted for duration of antibiotics.     - Complicated course of his empyema secondary to his medication noncompliance, leaving hospital AMA before completing work up  - Review CT, and if there is concern for recurrent empyema, consider consulting pulmonary for possible intervention   - If there is no indication for intervention, ontinue current antibiotics (Cipro and Augmentin) with End date 6/14

## 2018-05-23 NOTE — CONSULTS
[facility]  Infectious Disease  Consult Note    Patient Name: Da Cruz  MRN: [id number]  Admission Date: 5/21/2018  Hospital Length of Stay: 2 days  Attending Physician: João Kessler MD  Primary Care Provider: Pool Zambrano Iii, MD     Isolation Status: No active isolations    Patient information was obtained from patient, past medical records and ER records.      Inpatient consult to Infectious Diseases  Consult performed by: SULMA RUIZ  Consult ordered by: ROBEL EDWARDS        Assessment/Plan:     Empyema lung    This is Mr. Da Cruz, 53 year old male with PMHx significant for CAD S/P coronary artery stent placement resulted in HFrEF 10-15%, multiple pulmonary emboli. ID consulted for duration of antibiotics.     - Complicated course of his empyema secondary to his medication noncompliance, leaving hospital AMA before completing work up  - Follow the read on CT, and if there is concern for recurrent empyema, consider consulting pulmonary for possible intervention (Thoracocentesis) and fluid analysis   - Stop current antibiotics (cipro and Augmentin)           Thank you for your consult. I will follow-up with patient. Please contact us if you have any additional questions.    Sulma Ruiz MD  Infectious Disease  [facility]    Subjective:     Principal Problem: Acute on chronic combined systolic and diastolic heart failure    HPI: This is Mr. Da rCuz, 53 year old male with PMHx significant for CAD S/P coronary artery stent placement resulted in HFrEF 10-15%, multiple pulmonary emboli. His story for empyema oes back to 3/2018 when he had sub-massive PE and resulted in large pleural effusion (Dx at [facility]) then transferred to [place] for surgical intervention (Chest tube placement and > 2 Liter drained) with no micro or culture sent. Sputum culture grew MRSA (Sensitive to Cipro) and he was discharged with  Cipro and Augmentin for 2 weeks. subsequent X rays of chest showed improvement in pleural effusion. Around 4/2018, he was noted to have nausea and feeling sick, in ED at  he underwent CT scan showed signes of fluid collection, however the patient refuse to do any surgical intervention and went to [place] where pulmonary recommended CT-guided placement of a pigtail catheter for the persistent, unresolved effusion. Chest Pigtail placed twice dueing late 4/2018 admission, fluid analysis was Exudative, Augmentin 875 bid and Cipro 500 BID. Readmitting 5/12-5/16 with similar complain and pulmonary consulted and they recommend 4 - 6 weeks of antibiotics Cipro and Augmentin.    Past Medical History:   Diagnosis Date    Anxiety     CAD (coronary artery disease)     Carpal tunnel syndrome     CHF (congestive heart failure)     Hyperlipidemia     Kidney stones     Microscopic hematuria     Pulmonary emboli     S/P coronary artery stent placement     believes 6-7- unsure on amount    Shortness of breath on exertion        Past Surgical History:   Procedure Laterality Date    ABDOMINAL SURGERY      dick fundiplication    defibrilator      HAND SURGERY      left hand index finger tendon repair    heart stents         Review of patient's allergies indicates:   Allergen Reactions    Indomethacin        Medications:  Prescriptions Prior to Admission   Medication Sig    albuterol 2 mg/5 mL syrup Take 5 mLs (2 mg total) by mouth 3 (three) times daily.    alprazolam (XANAX) 2 MG Tab Take by mouth 3 (three) times daily as needed.    amoxicillin-clavulanate 875-125mg (AUGMENTIN) 875-125 mg per tablet Take 1 tablet by mouth every 12 (twelve) hours.    apixaban (ELIQUIS) 5 mg Tab Take 5 mg by mouth 2 (two) times daily.    aspirin (ECOTRIN) 81 MG EC tablet Take 1 tablet (81 mg total) by mouth once daily.    benzonatate (TESSALON) 100 MG capsule Take 100 mg by mouth 3 (three) times daily as needed for Cough.     ciprofloxacin HCl (CIPRO) 500 MG tablet Take 1 tablet (500 mg total) by mouth every 12 (twelve) hours.    CRESTOR 40 mg Tab once daily.     metoprolol succinate (TOPROL-XL) 25 MG 24 hr tablet Take 0.5 tablets (12.5 mg total) by mouth once daily.    NITROSTAT 0.4 mg SL tablet     potassium chloride SA (K-DUR,KLOR-CON) 20 MEQ tablet Take 20 mEq by mouth once daily.    umeclidinium (INCRUSE ELLIPTA) 62.5 mcg/actuation DsDv Inhale 62.5 mcg into the lungs once daily. Controller    docusate sodium (COLACE) 100 MG capsule Take 1 capsule (100 mg total) by mouth 2 (two) times daily.    pantoprazole (PROTONIX) 40 MG tablet Take 1 tablet (40 mg total) by mouth once daily.     Antibiotics     Start     Stop Route Frequency Ordered    05/21/18 2100  amoxicillin-clavulanate 875-125mg per tablet 1 tablet      -- Oral Every 12 hours 05/21/18 1424    05/21/18 2100  ciprofloxacin HCl tablet 500 mg      -- Oral Every 12 hours 05/21/18 1424        Antifungals     None        Antivirals     None             There is no immunization history on file for this patient.    Family History     Problem Relation (Age of Onset)    Diabetes Mother        Social History     Social History    Marital status:      Spouse name: N/A    Number of children: 3    Years of education: GED     Social History Main Topics    Smoking status: Current Some Day Smoker     Packs/day: 0.25     Years: 35.00    Smokeless tobacco: Never Used    Alcohol use No    Drug use: No    Sexual activity: Yes     Partners: Female     Other Topics Concern    None     Social History Narrative    None     Review of Systems   Constitutional: Negative for activity change and fever.   HENT: Negative for congestion.    Eyes: Negative for discharge.   Respiratory: Positive for cough and shortness of breath. Negative for chest tightness.    Cardiovascular: Negative for chest pain.   Gastrointestinal: Negative for abdominal distention.   Endocrine: Negative for  cold intolerance.   Genitourinary: Negative for difficulty urinating.   Musculoskeletal: Negative for back pain.   Neurological: Negative for weakness.   Psychiatric/Behavioral: Negative for agitation.     Objective:     Vital Signs (Most Recent):  Temp: 97.6 °F (36.4 °C) (05/23/18 0803)  Pulse: (!) 197 (05/23/18 1100)  Resp: 18 (05/23/18 0820)  BP: 98/60 (05/23/18 0803)  SpO2: 96 % (05/23/18 0803) Vital Signs (24h Range):  Temp:  [97.5 °F (36.4 °C)-97.8 °F (36.6 °C)] 97.6 °F (36.4 °C)  Pulse:  [] 197  Resp:  [17-20] 18  SpO2:  [94 %-99 %] 96 %  BP: ()/(59-74) 98/60     Weight: 93.4 kg (206 lb)  Body mass index is 28.73 kg/m².    Estimated Creatinine Clearance: 112 mL/min (based on SCr of 0.9 mg/dL).    Physical Exam   Constitutional: He is oriented to person, place, and time. He appears well-developed and well-nourished.   HENT:   Head: Normocephalic.   Eyes: Pupils are equal, round, and reactive to light. Right eye exhibits no discharge. Left eye exhibits no discharge.   Neck: No JVD present.   Cardiovascular: Normal rate and regular rhythm.    Pulmonary/Chest: Effort normal.   Decrease air entry bilateral more to the right lung    Abdominal: Soft. He exhibits no distension. There is no tenderness.   Musculoskeletal: He exhibits no edema.   Neurological: He is alert and oriented to person, place, and time.   Skin: Skin is warm.   Vitals reviewed.      Significant Labs:   CBC:   Recent Labs  Lab 05/22/18  0550 05/23/18  0558   WBC 5.14 5.88   HGB 12.1* 12.3*   HCT 40.0 39.8*    224     CMP:   Recent Labs  Lab 05/22/18  0550 05/22/18  1327 05/23/18  0558    137 136   K 2.9* 3.4* 3.0*   CL 98 98 96   CO2 26 29 29   GLU 79 150* 99   BUN 17 18 18   CREATININE 0.9 1.0 0.9   CALCIUM 9.2 9.6 9.7   PROT 6.6  --  6.8   ALBUMIN 3.1*  --  3.3*   BILITOT 1.1*  --  1.4*   ALKPHOS 145*  --  147*   AST 26  --  22   ALT 22  --  20   ANIONGAP 13 10 11   EGFRNONAA >60.0 >60.0 >60.0     All pertinent labs  within the past 24 hours have been reviewed.    Significant Imaging: I have reviewed all pertinent imaging results/findings within the past 24 hours.

## 2018-05-23 NOTE — PROGRESS NOTES
Patient had a 6 beat run of Vtach asymptomatic. Last Mg+1.6 & Potassium 3.9  received replacement on dayshift. Notified Dr. Vasquez order one dose of Potassium 20meq PO and Mag oxide 800mg PO x1. Will continue to monitor.

## 2018-05-23 NOTE — SUBJECTIVE & OBJECTIVE
Interval History: Continues to do well. No acute issues overnight.     Continuous Infusions:   furosemide (LASIX) 2 mg/mL infusion (non-titrating) 10 mg/hr (05/22/18 2248)     Scheduled Meds:   albuterol-ipratropium  3 mL Nebulization Q6H WAKE    amoxicillin-clavulanate 875-125mg  1 tablet Oral Q12H    apixaban  5 mg Oral BID    aspirin  81 mg Oral Daily    ciprofloxacin HCl  500 mg Oral Q12H    docusate sodium  100 mg Oral BID    magnesium oxide  800 mg Oral Daily    metoprolol succinate  12.5 mg Oral Daily    pantoprazole  40 mg Oral Daily    potassium chloride SA  40 mEq Oral Daily    rosuvastatin  40 mg Oral Daily    sodium chloride 0.9%  3 mL Intravenous Q8H    tiotropium  1 capsule Inhalation Daily     PRN Meds:acetaminophen, ALPRAZolam, benzonatate    Review of patient's allergies indicates:   Allergen Reactions    Indomethacin      Objective:     Vital Signs (Most Recent):  Temp: 97.5 °F (36.4 °C) (05/23/18 0536)  Pulse: (!) 50 (05/23/18 0536)  Resp: 17 (05/23/18 0358)  BP: 115/70 (05/23/18 0536)  SpO2: 96 % (05/23/18 0536) Vital Signs (24h Range):  Temp:  [97.5 °F (36.4 °C)-97.8 °F (36.6 °C)] 97.5 °F (36.4 °C)  Pulse:  [] 50  Resp:  [16-20] 17  SpO2:  [94 %-99 %] 96 %  BP: ()/(59-74) 115/70     Patient Vitals for the past 72 hrs (Last 3 readings):   Weight   05/21/18 1419 93.4 kg (206 lb)   05/21/18 1200 93.6 kg (206 lb 5.6 oz)     Body mass index is 28.73 kg/m².    Intake/Output Summary (Last 24 hours) at 05/23/18 0710  Last data filed at 05/23/18 0600   Gross per 24 hour   Intake             2085 ml   Output             3777 ml   Net            -1692 ml     Physical Exam    Gen: NAD. Resting comfortably in bed  HEENT: NC/AT. PERRL  Neck: JVD elevated 6cm above clavicle. Supple  Heart: Regular with mild tachycardia. +S3 heena. No murmrus heard  Lungs: Scant bibasilar rales with good air movement.   Abd: Soft. Non tender. Mild distention with no fluid wave. Normal BS.   Ext:  "Bilateral LE edema. Warm distally  Neuro: AAO x 3. NO focal deficits   Vitals reviewed    Significant Labs:  CBC:    Recent Labs  Lab 05/21/18  0716 05/22/18  0550   WBC 5.14 5.14   RBC 4.58* 4.65   HGB 12.0* 12.1*   HCT 40.4 40.0    227   MCV 88 86   MCH 26.2* 26.0*   MCHC 29.7* 30.3*     BNP:    Recent Labs  Lab 05/21/18  0716   BNP 3,094*     CMP:    Recent Labs  Lab 05/21/18  0716 05/22/18  0550 05/22/18  1327 05/23/18  0558   GLU 62* 79 150* 99   CALCIUM 9.7 9.2 9.6 9.7   ALBUMIN 3.1* 3.1*  --  3.3*   PROT 6.8 6.6  --  6.8    137 137 136   K 3.1* 2.9* 3.4* 3.0*   CO2 29 26 29 29    98 98 96   BUN 18 17 18 18   CREATININE 1.0 0.9 1.0 0.9   ALKPHOS 175* 145*  --  147*   ALT 24 22  --  20   AST 29 26  --  22   BILITOT 0.9 1.1*  --  1.4*      I have reviewed all pertinent labs within the past 24 hours.    Estimated Creatinine Clearance: 112 mL/min (based on SCr of 0.9 mg/dL).    Diagnostic Results:  CXR: X-ray Chest Pa And Lateral: "Pacemaker identified.  Mild cardiomegaly.  Ill-defined opacification identified at the right lung base posteriorly consistent with airspace consolidation or atelectasis.  Small amount of right-sided pleural effusion also noted.  The left lung is clear"  "

## 2018-05-23 NOTE — HPI
This is Mr. Da Cruz, 53 year old male with PMHx significant for CAD S/P coronary artery stent placement resulted in HFrEF 10-15%, multiple pulmonary emboli. His story for empyema oes back to 3/2018 when he had sub-massive PE and resulted in large pleural effusion (Dx at [facility]) then transferred to [place] for surgical intervention (Chest tube placement and > 2 Liter drained) with no micro or culture sent. Sputum culture grew MRSA (Sensitive to Cipro) and he was discharged with Cipro and Augmentin for 2 weeks. subsequent X rays of chest showed improvement in pleural effusion. Around 4/2018, he was noted to have nausea and feeling sick, in ED at  he underwent CT scan showed signes of fluid collection, however the patient refuse to do any surgical intervention and went to [place] where pulmonary recommended CT-guided placement of a pigtail catheter for the persistent, unresolved effusion. Chest Pigtail placed twice dueing late 4/2018 admission, fluid analysis was Exudative, Augmentin 875 bid and Cipro 500 BID. Readmitting 5/12-5/16 with similar complain and pulmonary consulted and they recommend 4 - 6 weeks of antibiotics Cipro and Augmentin.

## 2018-05-23 NOTE — PLAN OF CARE
Problem: Patient Care Overview  Goal: Individualization & Mutuality  Outcome: Ongoing (interventions implemented as appropriate)  Pt free of falls/traumas/injuries. Skin remains clean, dry, and intact. Pt given 80 mg IVP of lasix and lasix gtt increased to 20 mg/hr. Pt has a repeat BMP at 1500. K and Mag replaced. Pt s/p CT scan for re-evaluation of PE. Pt encouraged to ambulate. Pt re-educated on importance of measuring accurate intake and out put; pt verbalized and demonstrates understanding. Reviewed plan of care with pt; and pt verbalized understanding.  Pt VSS in no distress will continue to monitor.

## 2018-05-23 NOTE — SUBJECTIVE & OBJECTIVE
Past Medical History:   Diagnosis Date    Anxiety     CAD (coronary artery disease)     Carpal tunnel syndrome     CHF (congestive heart failure)     Hyperlipidemia     Kidney stones     Microscopic hematuria     Pulmonary emboli     S/P coronary artery stent placement     believes 6-7- unsure on amount    Shortness of breath on exertion        Past Surgical History:   Procedure Laterality Date    ABDOMINAL SURGERY      dick fundiplication    defibrilator      HAND SURGERY      left hand index finger tendon repair    heart stents         Review of patient's allergies indicates:   Allergen Reactions    Indomethacin        Medications:  Prescriptions Prior to Admission   Medication Sig    albuterol 2 mg/5 mL syrup Take 5 mLs (2 mg total) by mouth 3 (three) times daily.    alprazolam (XANAX) 2 MG Tab Take by mouth 3 (three) times daily as needed.    amoxicillin-clavulanate 875-125mg (AUGMENTIN) 875-125 mg per tablet Take 1 tablet by mouth every 12 (twelve) hours.    apixaban (ELIQUIS) 5 mg Tab Take 5 mg by mouth 2 (two) times daily.    aspirin (ECOTRIN) 81 MG EC tablet Take 1 tablet (81 mg total) by mouth once daily.    benzonatate (TESSALON) 100 MG capsule Take 100 mg by mouth 3 (three) times daily as needed for Cough.    ciprofloxacin HCl (CIPRO) 500 MG tablet Take 1 tablet (500 mg total) by mouth every 12 (twelve) hours.    CRESTOR 40 mg Tab once daily.     metoprolol succinate (TOPROL-XL) 25 MG 24 hr tablet Take 0.5 tablets (12.5 mg total) by mouth once daily.    NITROSTAT 0.4 mg SL tablet     potassium chloride SA (K-DUR,KLOR-CON) 20 MEQ tablet Take 20 mEq by mouth once daily.    umeclidinium (INCRUSE ELLIPTA) 62.5 mcg/actuation DsDv Inhale 62.5 mcg into the lungs once daily. Controller    docusate sodium (COLACE) 100 MG capsule Take 1 capsule (100 mg total) by mouth 2 (two) times daily.    pantoprazole (PROTONIX) 40 MG tablet Take 1 tablet (40 mg total) by mouth once daily.      Antibiotics     Start     Stop Route Frequency Ordered    05/21/18 2100  amoxicillin-clavulanate 875-125mg per tablet 1 tablet      -- Oral Every 12 hours 05/21/18 1424    05/21/18 2100  ciprofloxacin HCl tablet 500 mg      -- Oral Every 12 hours 05/21/18 1424        Antifungals     None        Antivirals     None             There is no immunization history on file for this patient.    Family History     Problem Relation (Age of Onset)    Diabetes Mother        Social History     Social History    Marital status:      Spouse name: N/A    Number of children: 3    Years of education: GED     Social History Main Topics    Smoking status: Current Some Day Smoker     Packs/day: 0.25     Years: 35.00    Smokeless tobacco: Never Used    Alcohol use No    Drug use: No    Sexual activity: Yes     Partners: Female     Other Topics Concern    None     Social History Narrative    None     Review of Systems   Constitutional: Negative for activity change and fever.   HENT: Negative for congestion.    Eyes: Negative for discharge.   Respiratory: Positive for cough and shortness of breath. Negative for chest tightness.    Cardiovascular: Negative for chest pain.   Gastrointestinal: Negative for abdominal distention.   Endocrine: Negative for cold intolerance.   Genitourinary: Negative for difficulty urinating.   Musculoskeletal: Negative for back pain.   Neurological: Negative for weakness.   Psychiatric/Behavioral: Negative for agitation.     Objective:     Vital Signs (Most Recent):  Temp: 97.6 °F (36.4 °C) (05/23/18 0803)  Pulse: (!) 197 (05/23/18 1100)  Resp: 18 (05/23/18 0820)  BP: 98/60 (05/23/18 0803)  SpO2: 96 % (05/23/18 0803) Vital Signs (24h Range):  Temp:  [97.5 °F (36.4 °C)-97.8 °F (36.6 °C)] 97.6 °F (36.4 °C)  Pulse:  [] 197  Resp:  [17-20] 18  SpO2:  [94 %-99 %] 96 %  BP: ()/(59-74) 98/60     Weight: 93.4 kg (206 lb)  Body mass index is 28.73 kg/m².    Estimated Creatinine  Clearance: 112 mL/min (based on SCr of 0.9 mg/dL).    Physical Exam   Constitutional: He is oriented to person, place, and time. He appears well-developed and well-nourished.   HENT:   Head: Normocephalic.   Eyes: Pupils are equal, round, and reactive to light. Right eye exhibits no discharge. Left eye exhibits no discharge.   Neck: No JVD present.   Cardiovascular: Normal rate and regular rhythm.    Pulmonary/Chest: Effort normal.   Decrease air entry bilateral more to the right lung    Abdominal: Soft. He exhibits no distension. There is no tenderness.   Musculoskeletal: He exhibits no edema.   Neurological: He is alert and oriented to person, place, and time.   Skin: Skin is warm.   Vitals reviewed.      Significant Labs:   CBC:   Recent Labs  Lab 05/22/18  0550 05/23/18  0558   WBC 5.14 5.88   HGB 12.1* 12.3*   HCT 40.0 39.8*    224     CMP:   Recent Labs  Lab 05/22/18  0550 05/22/18  1327 05/23/18  0558    137 136   K 2.9* 3.4* 3.0*   CL 98 98 96   CO2 26 29 29   GLU 79 150* 99   BUN 17 18 18   CREATININE 0.9 1.0 0.9   CALCIUM 9.2 9.6 9.7   PROT 6.6  --  6.8   ALBUMIN 3.1*  --  3.3*   BILITOT 1.1*  --  1.4*   ALKPHOS 145*  --  147*   AST 26  --  22   ALT 22  --  20   ANIONGAP 13 10 11   EGFRNONAA >60.0 >60.0 >60.0     All pertinent labs within the past 24 hours have been reviewed.    Significant Imaging: I have reviewed all pertinent imaging results/findings within the past 24 hours.

## 2018-05-23 NOTE — PLAN OF CARE
Problem: Patient Care Overview  Goal: Plan of Care Review  I went over the plan of care with the patient. The patient is independent and remained free of falls and skin breakdown.

## 2018-05-23 NOTE — ASSESSMENT & PLAN NOTE
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

## 2018-05-24 LAB
ALBUMIN SERPL BCP-MCNC: 3.3 G/DL
ALP SERPL-CCNC: 143 U/L
ALT SERPL W/O P-5'-P-CCNC: 17 U/L
ANION GAP SERPL CALC-SCNC: 10 MMOL/L
AST SERPL-CCNC: 21 U/L
BASOPHILS # BLD AUTO: 0.04 K/UL
BASOPHILS NFR BLD: 0.8 %
BILIRUB SERPL-MCNC: 1.3 MG/DL
BUN SERPL-MCNC: 14 MG/DL
CALCIUM SERPL-MCNC: 9.8 MG/DL
CHLORIDE SERPL-SCNC: 98 MMOL/L
CO2 SERPL-SCNC: 31 MMOL/L
CREAT SERPL-MCNC: 0.8 MG/DL
DIFFERENTIAL METHOD: ABNORMAL
EOSINOPHIL # BLD AUTO: 0.3 K/UL
EOSINOPHIL NFR BLD: 4.8 %
ERYTHROCYTE [DISTWIDTH] IN BLOOD BY AUTOMATED COUNT: 19.7 %
EST. GFR  (AFRICAN AMERICAN): >60 ML/MIN/1.73 M^2
EST. GFR  (NON AFRICAN AMERICAN): >60 ML/MIN/1.73 M^2
GLUCOSE SERPL-MCNC: 83 MG/DL
HCT VFR BLD AUTO: 41.1 %
HGB BLD-MCNC: 12.8 G/DL
IMM GRANULOCYTES # BLD AUTO: 0.02 K/UL
IMM GRANULOCYTES NFR BLD AUTO: 0.4 %
LYMPHOCYTES # BLD AUTO: 1.7 K/UL
LYMPHOCYTES NFR BLD: 32.8 %
MAGNESIUM SERPL-MCNC: 2.1 MG/DL
MCH RBC QN AUTO: 26.4 PG
MCHC RBC AUTO-ENTMCNC: 31.1 G/DL
MCV RBC AUTO: 85 FL
MONOCYTES # BLD AUTO: 0.6 K/UL
MONOCYTES NFR BLD: 10.8 %
NEUTROPHILS # BLD AUTO: 2.6 K/UL
NEUTROPHILS NFR BLD: 50.4 %
NRBC BLD-RTO: 0 /100 WBC
PLATELET # BLD AUTO: 229 K/UL
PMV BLD AUTO: 9.3 FL
POTASSIUM SERPL-SCNC: 3 MMOL/L
PROT SERPL-MCNC: 6.8 G/DL
RBC # BLD AUTO: 4.84 M/UL
SODIUM SERPL-SCNC: 139 MMOL/L
WBC # BLD AUTO: 5.19 K/UL

## 2018-05-24 PROCEDURE — 99232 SBSQ HOSP IP/OBS MODERATE 35: CPT | Mod: GC,NTX,, | Performed by: INTERNAL MEDICINE

## 2018-05-24 PROCEDURE — 63600175 PHARM REV CODE 636 W HCPCS: Mod: NTX | Performed by: INTERNAL MEDICINE

## 2018-05-24 PROCEDURE — 83735 ASSAY OF MAGNESIUM: CPT | Mod: NTX

## 2018-05-24 PROCEDURE — 94761 N-INVAS EAR/PLS OXIMETRY MLT: CPT | Mod: NTX

## 2018-05-24 PROCEDURE — 25000242 PHARM REV CODE 250 ALT 637 W/ HCPCS: Mod: NTX | Performed by: INTERNAL MEDICINE

## 2018-05-24 PROCEDURE — 80053 COMPREHEN METABOLIC PANEL: CPT | Mod: NTX

## 2018-05-24 PROCEDURE — 25000003 PHARM REV CODE 250: Mod: NTX | Performed by: INTERNAL MEDICINE

## 2018-05-24 PROCEDURE — 99233 SBSQ HOSP IP/OBS HIGH 50: CPT | Mod: NTX,,, | Performed by: INTERNAL MEDICINE

## 2018-05-24 PROCEDURE — 20600001 HC STEP DOWN PRIVATE ROOM: Mod: NTX

## 2018-05-24 PROCEDURE — 94664 DEMO&/EVAL PT USE INHALER: CPT | Mod: NTX

## 2018-05-24 PROCEDURE — 36415 COLL VENOUS BLD VENIPUNCTURE: CPT | Mod: NTX

## 2018-05-24 PROCEDURE — 94640 AIRWAY INHALATION TREATMENT: CPT | Mod: NTX

## 2018-05-24 PROCEDURE — 25000003 PHARM REV CODE 250: Mod: NTX | Performed by: PHYSICIAN ASSISTANT

## 2018-05-24 PROCEDURE — A4216 STERILE WATER/SALINE, 10 ML: HCPCS | Mod: NTX | Performed by: INTERNAL MEDICINE

## 2018-05-24 PROCEDURE — 85025 COMPLETE CBC W/AUTO DIFF WBC: CPT | Mod: NTX

## 2018-05-24 PROCEDURE — 99900035 HC TECH TIME PER 15 MIN (STAT): Mod: NTX

## 2018-05-24 RX ORDER — POTASSIUM CHLORIDE 20 MEQ/1
60 TABLET, EXTENDED RELEASE ORAL ONCE
Status: COMPLETED | OUTPATIENT
Start: 2018-05-24 | End: 2018-05-24

## 2018-05-24 RX ADMIN — POTASSIUM CHLORIDE 40 MEQ: 1500 TABLET, EXTENDED RELEASE ORAL at 09:05

## 2018-05-24 RX ADMIN — FUROSEMIDE 20 MG/HR: 10 INJECTION, SOLUTION INTRAMUSCULAR; INTRAVENOUS at 05:05

## 2018-05-24 RX ADMIN — FUROSEMIDE 20 MG/HR: 10 INJECTION, SOLUTION INTRAMUSCULAR; INTRAVENOUS at 03:05

## 2018-05-24 RX ADMIN — DOCUSATE SODIUM 100 MG: 100 CAPSULE, LIQUID FILLED ORAL at 09:05

## 2018-05-24 RX ADMIN — ALPRAZOLAM 1 MG: 1 TABLET ORAL at 09:05

## 2018-05-24 RX ADMIN — TIOTROPIUM BROMIDE 18 MCG: 18 CAPSULE ORAL; RESPIRATORY (INHALATION) at 09:05

## 2018-05-24 RX ADMIN — SODIUM CHLORIDE, PRESERVATIVE FREE 3 ML: 5 INJECTION INTRAVENOUS at 05:05

## 2018-05-24 RX ADMIN — IPRATROPIUM BROMIDE AND ALBUTEROL SULFATE 3 ML: .5; 3 SOLUTION RESPIRATORY (INHALATION) at 10:05

## 2018-05-24 RX ADMIN — METOPROLOL SUCCINATE 12.5 MG: 25 TABLET, EXTENDED RELEASE ORAL at 09:05

## 2018-05-24 RX ADMIN — POTASSIUM CHLORIDE 60 MEQ: 1500 TABLET, EXTENDED RELEASE ORAL at 09:05

## 2018-05-24 RX ADMIN — APIXABAN 5 MG: 5 TABLET, FILM COATED ORAL at 09:05

## 2018-05-24 RX ADMIN — ACETAMINOPHEN 650 MG: 325 TABLET ORAL at 06:05

## 2018-05-24 RX ADMIN — AMOXICILLIN AND CLAVULANATE POTASSIUM 1 TABLET: 875; 125 TABLET, FILM COATED ORAL at 09:05

## 2018-05-24 RX ADMIN — ROSUVASTATIN CALCIUM 40 MG: 20 TABLET, FILM COATED ORAL at 09:05

## 2018-05-24 RX ADMIN — CIPROFLOXACIN HYDROCHLORIDE 500 MG: 500 TABLET, FILM COATED ORAL at 09:05

## 2018-05-24 RX ADMIN — Medication 800 MG: at 09:05

## 2018-05-24 RX ADMIN — RAMELTEON 8 MG: 8 TABLET, FILM COATED ORAL at 09:05

## 2018-05-24 RX ADMIN — SODIUM CHLORIDE, PRESERVATIVE FREE 3 ML: 5 INJECTION INTRAVENOUS at 02:05

## 2018-05-24 RX ADMIN — IPRATROPIUM BROMIDE AND ALBUTEROL SULFATE 3 ML: .5; 3 SOLUTION RESPIRATORY (INHALATION) at 01:05

## 2018-05-24 RX ADMIN — PANTOPRAZOLE SODIUM 40 MG: 40 TABLET, DELAYED RELEASE ORAL at 09:05

## 2018-05-24 RX ADMIN — ALPRAZOLAM 1 MG: 1 TABLET ORAL at 06:05

## 2018-05-24 RX ADMIN — ASPIRIN 81 MG: 81 TABLET, COATED ORAL at 09:05

## 2018-05-24 RX ADMIN — ACETAMINOPHEN 650 MG: 325 TABLET ORAL at 09:05

## 2018-05-24 RX ADMIN — IPRATROPIUM BROMIDE AND ALBUTEROL SULFATE 3 ML: .5; 3 SOLUTION RESPIRATORY (INHALATION) at 07:05

## 2018-05-24 NOTE — ASSESSMENT & PLAN NOTE
- s/p IR drainage earlier this year  - Appreciate ID consultation; abx stopped and Pulmonology consulted

## 2018-05-24 NOTE — SUBJECTIVE & OBJECTIVE
Past Medical History:   Diagnosis Date    Anxiety     CAD (coronary artery disease)     Carpal tunnel syndrome     CHF (congestive heart failure)     Hyperlipidemia     Kidney stones     Microscopic hematuria     Pulmonary emboli     S/P coronary artery stent placement     believes 6-7- unsure on amount    Shortness of breath on exertion        Past Surgical History:   Procedure Laterality Date    ABDOMINAL SURGERY      dick fundiplication    defibrilator      HAND SURGERY      left hand index finger tendon repair    heart stents         Review of patient's allergies indicates:   Allergen Reactions    Indomethacin        Medications:  Prescriptions Prior to Admission   Medication Sig    albuterol 2 mg/5 mL syrup Take 5 mLs (2 mg total) by mouth 3 (three) times daily.    alprazolam (XANAX) 2 MG Tab Take by mouth 3 (three) times daily as needed.    amoxicillin-clavulanate 875-125mg (AUGMENTIN) 875-125 mg per tablet Take 1 tablet by mouth every 12 (twelve) hours.    apixaban (ELIQUIS) 5 mg Tab Take 5 mg by mouth 2 (two) times daily.    aspirin (ECOTRIN) 81 MG EC tablet Take 1 tablet (81 mg total) by mouth once daily.    benzonatate (TESSALON) 100 MG capsule Take 100 mg by mouth 3 (three) times daily as needed for Cough.    ciprofloxacin HCl (CIPRO) 500 MG tablet Take 1 tablet (500 mg total) by mouth every 12 (twelve) hours.    CRESTOR 40 mg Tab once daily.     metoprolol succinate (TOPROL-XL) 25 MG 24 hr tablet Take 0.5 tablets (12.5 mg total) by mouth once daily.    NITROSTAT 0.4 mg SL tablet     potassium chloride SA (K-DUR,KLOR-CON) 20 MEQ tablet Take 20 mEq by mouth once daily.    umeclidinium (INCRUSE ELLIPTA) 62.5 mcg/actuation DsDv Inhale 62.5 mcg into the lungs once daily. Controller    docusate sodium (COLACE) 100 MG capsule Take 1 capsule (100 mg total) by mouth 2 (two) times daily.    pantoprazole (PROTONIX) 40 MG tablet Take 1 tablet (40 mg total) by mouth once daily.      Antibiotics     Start     Stop Route Frequency Ordered    05/21/18 2100  amoxicillin-clavulanate 875-125mg per tablet 1 tablet      -- Oral Every 12 hours 05/21/18 1424    05/21/18 2100  ciprofloxacin HCl tablet 500 mg      -- Oral Every 12 hours 05/21/18 1424        Antifungals     None        Antivirals     None             There is no immunization history on file for this patient.    Family History     Problem Relation (Age of Onset)    Diabetes Mother        Social History     Social History    Marital status:      Spouse name: N/A    Number of children: 3    Years of education: GED     Social History Main Topics    Smoking status: Current Some Day Smoker     Packs/day: 0.25     Years: 35.00    Smokeless tobacco: Never Used    Alcohol use No    Drug use: No    Sexual activity: Yes     Partners: Female     Other Topics Concern    None     Social History Narrative    None     Review of Systems   Constitutional: Positive for activity change and fatigue. Negative for appetite change and fever.   HENT: Negative for congestion and dental problem.    Respiratory: Positive for cough. Negative for choking, chest tightness, shortness of breath and wheezing.    Cardiovascular: Negative for chest pain.   Gastrointestinal: Negative for abdominal distention and abdominal pain.   Genitourinary: Negative for difficulty urinating.   Musculoskeletal: Negative for arthralgias.   Skin: Negative for color change.   Psychiatric/Behavioral: Negative for agitation.     Objective:     Vital Signs (Most Recent):  Temp: 97.6 °F (36.4 °C) (05/24/18 0414)  Pulse: 100 (05/24/18 0727)  Resp: (!) 21 (05/24/18 0727)  BP: (!) 84/54 (05/24/18 0414)  SpO2: (!) 94 % (05/24/18 0727) Vital Signs (24h Range):  Temp:  [97.3 °F (36.3 °C)-98.1 °F (36.7 °C)] 97.6 °F (36.4 °C)  Pulse:  [] 100  Resp:  [16-21] 21  SpO2:  [93 %-98 %] 94 %  BP: ()/(54-75) 84/54     Weight: 90.3 kg (199 lb)  Body mass index is 27.75  kg/m².    Estimated Creatinine Clearance: 115 mL/min (based on SCr of 0.8 mg/dL).    Physical Exam   Constitutional: He is oriented to person, place, and time. He appears well-developed and well-nourished.   HENT:   Head: Normocephalic.   Eyes: Pupils are equal, round, and reactive to light. Right eye exhibits no discharge. Left eye exhibits no discharge.   Neck: No JVD present.   Cardiovascular: Normal rate and regular rhythm.    Pulmonary/Chest: Effort normal. He has no wheezes.   Decrease air entry bilateral.    Abdominal: Soft. He exhibits no distension. There is no tenderness.   Musculoskeletal: He exhibits no edema.   Neurological: He is alert and oriented to person, place, and time.   Skin: Skin is warm.   Vitals reviewed.      Significant Labs:   CBC:   Recent Labs  Lab 05/23/18  0558 05/24/18  0538   WBC 5.88 5.19   HGB 12.3* 12.8*   HCT 39.8* 41.1    229     CMP:   Recent Labs  Lab 05/23/18  0558 05/23/18  1513 05/24/18  0538    137 139   K 3.0* 3.0* 3.0*   CL 96 93* 98   CO2 29 35* 31*   GLU 99 107 83   BUN 18 17 14   CREATININE 0.9 0.9 0.8   CALCIUM 9.7 9.7 9.8   PROT 6.8  --  6.8   ALBUMIN 3.3*  --  3.3*   BILITOT 1.4*  --  1.3*   ALKPHOS 147*  --  143*   AST 22  --  21   ALT 20  --  17   ANIONGAP 11 9 10   EGFRNONAA >60.0 >60.0 >60.0     Significant Imaging: I have reviewed all pertinent imaging results/findings within the past 24 hours.

## 2018-05-24 NOTE — PLAN OF CARE
Problem: Patient Care Overview  Goal: Plan of Care Review  Outcome: Ongoing (interventions implemented as appropriate)  No acute events overnight. Patient continues on IV Lasix gtt at 20mg/hr. Patient voiding independently and educated on the importance of accurate I+O's and following his fluid restriction.

## 2018-05-24 NOTE — CONSULTS
[facility]  Infectious Disease  Consult Note    Patient Name: Da Cruz  MRN: [id number]  Admission Date: 5/21/2018  Hospital Length of Stay: 3 days  Attending Physician: João Kessler MD  Primary Care Provider: Pool Zambrano Iii, MD     Isolation Status: No active isolations    Patient information was obtained from patient and past medical records.      Consults  Assessment/Plan:     Empyema lung    This is Mr. Da Cruz, 53 year old male with PMHx significant for CAD S/P coronary artery stent placement resulted in HFrEF 10-15%, multiple pulmonary emboli. ID consulted for duration of antibiotics of left pleural effusion/empyema.    Interval history:  Underwent CTA which showed un resolving PE and suspicious for empyema of the right pleural effusion.     Recommendations:   - Complicated course of his empyema secondary to his medication noncompliance, leaving hospital AMA before completing work up  - Complicated course of his empyema secondary to his medication noncompliance, leaving hospital AMA before completing work up  - Consult Pulmonary for Thoracocentesis and send for analysis for LDH, cell count, culture, gram stain, AFB stain   - Current antibiotic (Cipro and Augmentin) seems to be insufficient to treat his underlying MRSA, please stop antibiotics and will follow Thoracocentesis results, might need Vancomycin instead.             Thank you for your consult. I will follow-up with patient. Please contact us if you have any additional questions.    Buster Watson MD  Infectious Disease  [facility]    Subjective:     Principal Problem: Acute on chronic combined systolic and diastolic heart failure    HPI: This is Mr. Da Cruz, 53 year old male with PMHx significant for CAD S/P coronary artery stent placement resulted in HFrEF 10-15%, multiple pulmonary emboli. His story for empyema oes back to 3/2018 when he had sub-massive PE and  resulted in large pleural effusion (Dx at [facility]) then transferred to [facility] for surgical intervention (Chest tube placement and > 2 Liter drained) with no micro or culture sent. Sputum culture grew MRSA (Sensitive to Cipro) and he was discharged with Cipro and Augmentin for 2 weeks. subsequent X rays of chest showed improvement in pleural effusion. Around 4/2018, he was noted to have nausea and feeling sick, in ED at  he underwent CT scan showed signes of fluid collection, however the patient refuse to do any surgical intervention and went to [facility] where pulmonary recommended CT-guided placement of a pigtail catheter for the persistent, unresolved effusion. Chest Pigtail placed twice dueing late 4/2018 admission, fluid analysis was Exudative, Augmentin 875 bid and Cipro 500 BID. Readmitting 5/12-5/16 with similar complain and pulmonary consulted and they recommend 4 - 6 weeks of antibiotics Cipro and Augmentin.    Past Medical History:   Diagnosis Date    Anxiety     CAD (coronary artery disease)     Carpal tunnel syndrome     CHF (congestive heart failure)     Hyperlipidemia     Kidney stones     Microscopic hematuria     Pulmonary emboli     S/P coronary artery stent placement     believes 6-7- unsure on amount    Shortness of breath on exertion        Past Surgical History:   Procedure Laterality Date    ABDOMINAL SURGERY      dick fundiplication    defibrilator      HAND SURGERY      left hand index finger tendon repair    heart stents         Review of patient's allergies indicates:   Allergen Reactions    Indomethacin        Medications:  Prescriptions Prior to Admission   Medication Sig    albuterol 2 mg/5 mL syrup Take 5 mLs (2 mg total) by mouth 3 (three) times daily.    alprazolam (XANAX) 2 MG Tab Take by mouth 3 (three) times daily as needed.    amoxicillin-clavulanate 875-125mg (AUGMENTIN) 875-125 mg per tablet Take 1 tablet by mouth every 12 (twelve) hours.     apixaban (ELIQUIS) 5 mg Tab Take 5 mg by mouth 2 (two) times daily.    aspirin (ECOTRIN) 81 MG EC tablet Take 1 tablet (81 mg total) by mouth once daily.    benzonatate (TESSALON) 100 MG capsule Take 100 mg by mouth 3 (three) times daily as needed for Cough.    ciprofloxacin HCl (CIPRO) 500 MG tablet Take 1 tablet (500 mg total) by mouth every 12 (twelve) hours.    CRESTOR 40 mg Tab once daily.     metoprolol succinate (TOPROL-XL) 25 MG 24 hr tablet Take 0.5 tablets (12.5 mg total) by mouth once daily.    NITROSTAT 0.4 mg SL tablet     potassium chloride SA (K-DUR,KLOR-CON) 20 MEQ tablet Take 20 mEq by mouth once daily.    umeclidinium (INCRUSE ELLIPTA) 62.5 mcg/actuation DsDv Inhale 62.5 mcg into the lungs once daily. Controller    docusate sodium (COLACE) 100 MG capsule Take 1 capsule (100 mg total) by mouth 2 (two) times daily.    pantoprazole (PROTONIX) 40 MG tablet Take 1 tablet (40 mg total) by mouth once daily.     Antibiotics     Start     Stop Route Frequency Ordered    05/21/18 2100  amoxicillin-clavulanate 875-125mg per tablet 1 tablet      -- Oral Every 12 hours 05/21/18 1424    05/21/18 2100  ciprofloxacin HCl tablet 500 mg      -- Oral Every 12 hours 05/21/18 1424        Antifungals     None        Antivirals     None             There is no immunization history on file for this patient.    Family History     Problem Relation (Age of Onset)    Diabetes Mother        Social History     Social History    Marital status:      Spouse name: N/A    Number of children: 3    Years of education: GED     Social History Main Topics    Smoking status: Current Some Day Smoker     Packs/day: 0.25     Years: 35.00    Smokeless tobacco: Never Used    Alcohol use No    Drug use: No    Sexual activity: Yes     Partners: Female     Other Topics Concern    None     Social History Narrative    None     Review of Systems   Constitutional: Positive for activity change and fatigue. Negative for  appetite change and fever.   HENT: Negative for congestion and dental problem.    Respiratory: Positive for cough. Negative for choking, chest tightness, shortness of breath and wheezing.    Cardiovascular: Negative for chest pain.   Gastrointestinal: Negative for abdominal distention and abdominal pain.   Genitourinary: Negative for difficulty urinating.   Musculoskeletal: Negative for arthralgias.   Skin: Negative for color change.   Psychiatric/Behavioral: Negative for agitation.     Objective:     Vital Signs (Most Recent):  Temp: 97.6 °F (36.4 °C) (05/24/18 0414)  Pulse: 100 (05/24/18 0727)  Resp: (!) 21 (05/24/18 0727)  BP: (!) 84/54 (05/24/18 0414)  SpO2: (!) 94 % (05/24/18 0727) Vital Signs (24h Range):  Temp:  [97.3 °F (36.3 °C)-98.1 °F (36.7 °C)] 97.6 °F (36.4 °C)  Pulse:  [] 100  Resp:  [16-21] 21  SpO2:  [93 %-98 %] 94 %  BP: ()/(54-75) 84/54     Weight: 90.3 kg (199 lb)  Body mass index is 27.75 kg/m².    Estimated Creatinine Clearance: 115 mL/min (based on SCr of 0.8 mg/dL).    Physical Exam   Constitutional: He is oriented to person, place, and time. He appears well-developed and well-nourished.   HENT:   Head: Normocephalic.   Eyes: Pupils are equal, round, and reactive to light. Right eye exhibits no discharge. Left eye exhibits no discharge.   Neck: No JVD present.   Cardiovascular: Normal rate and regular rhythm.    Pulmonary/Chest: Effort normal. He has no wheezes.   Decrease air entry bilateral.    Abdominal: Soft. He exhibits no distension. There is no tenderness.   Musculoskeletal: He exhibits no edema.   Neurological: He is alert and oriented to person, place, and time.   Skin: Skin is warm.   Vitals reviewed.      Significant Labs:   CBC:   Recent Labs  Lab 05/23/18  0558 05/24/18  0538   WBC 5.88 5.19   HGB 12.3* 12.8*   HCT 39.8* 41.1    229     CMP:   Recent Labs  Lab 05/23/18  0558 05/23/18  1513 05/24/18  0538    137 139   K 3.0* 3.0* 3.0*   CL 96 93* 98   CO2  29 35* 31*   GLU 99 107 83   BUN 18 17 14   CREATININE 0.9 0.9 0.8   CALCIUM 9.7 9.7 9.8   PROT 6.8  --  6.8   ALBUMIN 3.3*  --  3.3*   BILITOT 1.4*  --  1.3*   ALKPHOS 147*  --  143*   AST 22  --  21   ALT 20  --  17   ANIONGAP 11 9 10   EGFRNONAA >60.0 >60.0 >60.0     Significant Imaging: I have reviewed all pertinent imaging results/findings within the past 24 hours.

## 2018-05-24 NOTE — ASSESSMENT & PLAN NOTE
- Etiology: HFrEF 2/2 ICM with possible concomitant CTEPH s/p PE earlier this year with residual clots seen on follow up CT scans   - CT chest with contrast consistent with pulmonary thromboemboli; no infarction  - Wet / Warm on exam   - Continue Lasix gtt at 10/hr   - Net negative 2Ls over past 24 hours   - Aggressively replace lytes (K>4 and Mg >2)  - GDMT: Continue low dose metoprolol XL for now   - Strict I/Os and daily weights  - TTE reviewed.

## 2018-05-24 NOTE — PHYSICIAN QUERY
PT Name: Da Cruz  MR #: [id number]     Physician Query Form - Documentation Clarification      CDS/: Guerda Varghese                Contact information:[email or web address]    This form is a permanent document in the medical record.     Query Date: May 24, 2018    By submitting this query, we are merely seeking further clarification of documentation. Please utilize your independent clinical judgment when addressing the question(s) below.    The Medical record reflects the following:    Supporting Clinical Findings Location in Medical Record   Patient had a 6 beat run of Vtach asymptomatic. Last Mg+1.6 & Potassium 3.9  received replacement on dayshift. Notified Dr. Vasquez order one dose of Potassium 20meq PO and Mag oxide  800mg PO x1.      RN progress note 5-23                                                                                Doctor, Please specify diagnosis or diagnoses associated with above clinical findings.    Provider Use Only      [x     ]  Hypokalemia and Hypomagnesemia      [     ]  Other:please specify                                                                                                                   [  ] Clinically undetermined

## 2018-05-24 NOTE — SUBJECTIVE & OBJECTIVE
Interval History: CT of chest done with multifocal pulmonary thromboemboli and persistent complex right pleural fluid collection.  Patient diuresed well over night. He is net negative 2L over last 24 hours.  Weight 199lbs today down from 206lbs on admit.     Continuous Infusions:   furosemide (LASIX) 2 mg/mL infusion (non-titrating) 20 mg/hr (05/24/18 0337)     Scheduled Meds:   albuterol-ipratropium  3 mL Nebulization Q6H WAKE    apixaban  5 mg Oral BID    aspirin  81 mg Oral Daily    docusate sodium  100 mg Oral BID    magnesium oxide  800 mg Oral Daily    metoprolol succinate  12.5 mg Oral Daily    pantoprazole  40 mg Oral Daily    potassium chloride SA  40 mEq Oral BID    rosuvastatin  40 mg Oral Daily    sodium chloride 0.9%  3 mL Intravenous Q8H    tiotropium  1 capsule Inhalation Daily     PRN Meds:acetaminophen, ALPRAZolam, benzonatate, ramelteon    Review of patient's allergies indicates:   Allergen Reactions    Indomethacin      Objective:     Vital Signs (Most Recent):  Temp: 97.4 °F (36.3 °C) (05/24/18 0910)  Pulse: 110 (05/24/18 1151)  Resp: 18 (05/24/18 1151)  BP: (!) 94/58 (05/24/18 1151)  SpO2: 98 % (05/24/18 1151) Vital Signs (24h Range):  Temp:  [97.3 °F (36.3 °C)-98.1 °F (36.7 °C)] 97.4 °F (36.3 °C)  Pulse:  [] 110  Resp:  [16-21] 18  SpO2:  [93 %-98 %] 98 %  BP: ()/(54-75) 94/58     Patient Vitals for the past 72 hrs (Last 3 readings):   Weight   05/24/18 0716 90.3 kg (199 lb)   05/21/18 1419 93.4 kg (206 lb)     Body mass index is 27.75 kg/m².    Intake/Output Summary (Last 24 hours) at 05/24/18 1220  Last data filed at 05/24/18 0920   Gross per 24 hour   Intake             1200 ml   Output             4872 ml   Net            -3672 ml     Physical Exam    Gen: NAD. Resting comfortably in bed  HEENT: NC/AT. PERRL  Neck: JVD elevated 6cm above clavicle. Supple  Heart: Regular with mild tachycardia. +S3 heena. No murmrus heard  Lungs: Scant bibasilar rales with good air  "movement. Decrease breath sound RLL  Abd: Soft. Non tender. Mild distention with no fluid wave. Normal BS.   Ext: Bilateral LE edema. Warm distally  Neuro: AAO x 3. NO focal deficits   Vitals reviewed    Significant Labs:  CBC:    Recent Labs  Lab 05/22/18  0550 05/23/18  0558 05/24/18  0538   WBC 5.14 5.88 5.19   RBC 4.65 4.68 4.84   HGB 12.1* 12.3* 12.8*   HCT 40.0 39.8* 41.1    224 229   MCV 86 85 85   MCH 26.0* 26.3* 26.4*   MCHC 30.3* 30.9* 31.1*     BNP:    Recent Labs  Lab 05/21/18  0716   BNP 3,094*     CMP:    Recent Labs  Lab 05/22/18  0550  05/23/18  0558 05/23/18  1513 05/24/18  0538   GLU 79  < > 99 107 83   CALCIUM 9.2  < > 9.7 9.7 9.8   ALBUMIN 3.1*  --  3.3*  --  3.3*   PROT 6.6  --  6.8  --  6.8     < > 136 137 139   K 2.9*  < > 3.0* 3.0* 3.0*   CO2 26  < > 29 35* 31*   CL 98  < > 96 93* 98   BUN 17  < > 18 17 14   CREATININE 0.9  < > 0.9 0.9 0.8   ALKPHOS 145*  --  147*  --  143*   ALT 22  --  20  --  17   AST 26  --  22  --  21   BILITOT 1.1*  --  1.4*  --  1.3*   < > = values in this interval not displayed.   I have reviewed all pertinent labs within the past 24 hours.    Estimated Creatinine Clearance: 115 mL/min (based on SCr of 0.8 mg/dL).    Diagnostic Results:  CXR: X-ray Chest Pa And Lateral: "Pacemaker identified.  Mild cardiomegaly.  Ill-defined opacification identified at the right lung base posteriorly consistent with airspace consolidation or atelectasis.  Small amount of right-sided pleural effusion also noted.  The left lung is clear"  "

## 2018-05-24 NOTE — ASSESSMENT & PLAN NOTE
This is Mr. Da Cruz, 53 year old male with PMHx significant for CAD S/P coronary artery stent placement resulted in HFrEF 10-15%, multiple pulmonary emboli. ID consulted for duration of antibiotics of left pleural effusion/empyema.    Interval history:  Underwent CTA which showed un resolving PE and suspicious for empyema of the right pleural effusion.     Recommendations:   - Complicated course of his empyema secondary to his medication noncompliance, leaving hospital AMA before completing work up  - Complicated course of his empyema secondary to his medication noncompliance, leaving hospital AMA before completing work up  - Consult Pulmonary for Thoracocentesis and send for analysis for LDH, cell count, culture, gram stain, AFB stain   - Current antibiotic (Cipro and Augmentin) seems to be insufficient to treat his underlying MRSA, please stop antibiotics and will follow Thoracocentesis results, might need Vancomycin instead.

## 2018-05-24 NOTE — PROGRESS NOTES
[facility]  Heart Transplant  Progress Note    Patient Name: Da Cruz  MRN: [id number]  Admission Date: 5/21/2018  Hospital Length of Stay: 3 days  Attending Physician: João Kessler MD  Primary Care Provider: Pool Zambrano Iii, MD  Principal Problem:Acute on chronic combined systolic and diastolic heart failure    Subjective:     Interval History: CT of chest done with multifocal pulmonary thromboemboli and persistent complex right pleural fluid collection.  Patient diuresed well over night. He is net negative 2L over last 24 hours.  Weight 199lbs today down from 206lbs on admit.     Continuous Infusions:   furosemide (LASIX) 2 mg/mL infusion (non-titrating) 20 mg/hr (05/24/18 0337)     Scheduled Meds:   albuterol-ipratropium  3 mL Nebulization Q6H WAKE    apixaban  5 mg Oral BID    aspirin  81 mg Oral Daily    docusate sodium  100 mg Oral BID    magnesium oxide  800 mg Oral Daily    metoprolol succinate  12.5 mg Oral Daily    pantoprazole  40 mg Oral Daily    potassium chloride SA  40 mEq Oral BID    rosuvastatin  40 mg Oral Daily    sodium chloride 0.9%  3 mL Intravenous Q8H    tiotropium  1 capsule Inhalation Daily     PRN Meds:acetaminophen, ALPRAZolam, benzonatate, ramelteon    Review of patient's allergies indicates:   Allergen Reactions    Indomethacin      Objective:     Vital Signs (Most Recent):  Temp: 97.4 °F (36.3 °C) (05/24/18 0910)  Pulse: 110 (05/24/18 1151)  Resp: 18 (05/24/18 1151)  BP: (!) 94/58 (05/24/18 1151)  SpO2: 98 % (05/24/18 1151) Vital Signs (24h Range):  Temp:  [97.3 °F (36.3 °C)-98.1 °F (36.7 °C)] 97.4 °F (36.3 °C)  Pulse:  [] 110  Resp:  [16-21] 18  SpO2:  [93 %-98 %] 98 %  BP: ()/(54-75) 94/58     Patient Vitals for the past 72 hrs (Last 3 readings):   Weight   05/24/18 0716 90.3 kg (199 lb)   05/21/18 1419 93.4 kg (206 lb)     Body mass index is 27.75 kg/m².    Intake/Output Summary (Last 24 hours) at 05/24/18 1220  Last data  "filed at 05/24/18 0920   Gross per 24 hour   Intake             1200 ml   Output             4872 ml   Net            -3672 ml     Physical Exam    Gen: NAD. Resting comfortably in bed  HEENT: NC/AT. PERRL  Neck: JVD elevated 6cm above clavicle. Supple  Heart: Regular with mild tachycardia. +S3 heena. No murmrus heard  Lungs: Scant bibasilar rales with good air movement. Decrease breath sound RLL  Abd: Soft. Non tender. Mild distention with no fluid wave. Normal BS.   Ext: Bilateral LE edema. Warm distally  Neuro: AAO x 3. NO focal deficits   Vitals reviewed    Significant Labs:  CBC:    Recent Labs  Lab 05/22/18  0550 05/23/18  0558 05/24/18  0538   WBC 5.14 5.88 5.19   RBC 4.65 4.68 4.84   HGB 12.1* 12.3* 12.8*   HCT 40.0 39.8* 41.1    224 229   MCV 86 85 85   MCH 26.0* 26.3* 26.4*   MCHC 30.3* 30.9* 31.1*     BNP:    Recent Labs  Lab 05/21/18  0716   BNP 3,094*     CMP:    Recent Labs  Lab 05/22/18  0550  05/23/18  0558 05/23/18  1513 05/24/18  0538   GLU 79  < > 99 107 83   CALCIUM 9.2  < > 9.7 9.7 9.8   ALBUMIN 3.1*  --  3.3*  --  3.3*   PROT 6.6  --  6.8  --  6.8     < > 136 137 139   K 2.9*  < > 3.0* 3.0* 3.0*   CO2 26  < > 29 35* 31*   CL 98  < > 96 93* 98   BUN 17  < > 18 17 14   CREATININE 0.9  < > 0.9 0.9 0.8   ALKPHOS 145*  --  147*  --  143*   ALT 22  --  20  --  17   AST 26  --  22  --  21   BILITOT 1.1*  --  1.4*  --  1.3*   < > = values in this interval not displayed.   I have reviewed all pertinent labs within the past 24 hours.    Estimated Creatinine Clearance: 115 mL/min (based on SCr of 0.8 mg/dL).    Diagnostic Results:  CXR: X-ray Chest Pa And Lateral: "Pacemaker identified.  Mild cardiomegaly.  Ill-defined opacification identified at the right lung base posteriorly consistent with airspace consolidation or atelectasis.  Small amount of right-sided pleural effusion also noted.  The left lung is clear"    Assessment and Plan:     "Mr. Cruz is a 53 y.o. year old White male who " has presents to be considered for advanced surgical options (LVAD/OHT).      Patient presents after several admissions for various issues including ADHF and infection since January of this year. He states he has had ICM, s/p LAD and RCA PCI following MI in 2009, after which he initially did quite well, however the last 7 months or so has had dramatic decline in how he is doing. Was smoking and drinking (not drinking very heavily at all), but smoking up until January of this year. Still has cigarettes once in a while, however not recently. No ETOH at all anymore. January had an admisson for ADHF, was subsequently discharged home. February developed unilateral leg swelling of left LE, with subsequently described bilateral submassive pulmonary embolism (CTscan of chest with IV contrast last taken 05/07/18 at [facility] demonstrates residual PE in right middle and right lower pulmonary artery). He was placed on eliquis after this. Subsequently had admission to [facility] for MRSA pneumonia, appears from notes that he left AMA without completing treatment, patient states that he actually has left 3 different hospitals AMA due to not feeling he was getting appropriate/adequate care, and he feels he has continued to decline. Got into a little argument with his mother about this at the time he told me this.      He is coughing a significant amount, increased since the most recent admission he had at [facility], left the hospital 05/16/18, where he was admitted for 3 days for extreme hypotension and what patient describes as hypovolemia. He was admitted to the ICU with hypotension of SBP to 60s, he had recently been seen in clinic where his systolics had been stable in the 80s. He was started on IV levophed, had significant wheezing, CT chest with suspicion for empyema and/or lung abscess, unchanged, opacities in left lingula unchanged as well. Appears uncertain that he received significant fluids in hospital,  "however did have lasix held until he could follow up with us, was started on metoprolol 12.5mg po daily as well, and per pulmonary was recommended 1 month of oral abx treatment with no indication for VATS.       Additionally he has increased abdominal swelling since discharge, although he had his mother state he has had significantly more abdominal swelling in the past.      CT Chest non-contrast: 05/14/18:  1. The drainage catheter is been removed  2. Right basilar pleural collection and lung consolidation suspicious for empyema and/or lung abscess unchanged  3. Opacities left lingula unchanged with nothing new     Most recent CTA of his chest appears to be from 05/04/18 at [facility] in care everywhere:  Outside CT scan without a report is available dated April 19, 2018. Patient also has a history of lung abscess and pulmonary embolus.  Study is degraded by motion artifact. Volume loss and scarring of the left lung base is identified. There is a cavitary lesion at the right lung base which appears to be decreased in size when compared to the prior study. This lesion currently measures 2.8 cm in greatest dimension compared to prior measurement of 4.7 cm. There is adjacent infiltration with pleural reaction. The bony structures demonstrate degenerative changes but nothing acute.  There is persistent thrombus within the right lower lobe pulmonary artery and possibly within the left upper lobe pulmonary artery. There is considerable contrast material within the inferior vena cava and right heart consistent with right heart strain. There is left atrial enlargement.     03/01/18 TTE (Dr. Nicholas):  LVEDD 7cm, LVEF 10%  TR mild-mod, peak TV gradient 21 mm Hg.  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         Empyema lung    - s/p IR drainage earlier this year  - Appreciate ID consultation; abx stopped and Pulmonology consulted           Pulmonary embolism    - Continue apixaban 5mg BID  - CT chest 5/23 reviewed   - TTE reveiwed.           Coronary artery disease involving native coronary artery    - Continue ASA / Statin         Anxiety    - Continue low dose ativan TID PRN               MATIAS Merino  Heart Transplant  [facility]

## 2018-05-25 VITALS
BODY MASS INDEX: 27.19 KG/M2 | WEIGHT: 194.25 LBS | OXYGEN SATURATION: 96 % | HEART RATE: 62 BPM | SYSTOLIC BLOOD PRESSURE: 86 MMHG | RESPIRATION RATE: 18 BRPM | HEIGHT: 71 IN | DIASTOLIC BLOOD PRESSURE: 70 MMHG | TEMPERATURE: 98 F

## 2018-05-25 LAB
ALBUMIN SERPL BCP-MCNC: 3.5 G/DL
ALP SERPL-CCNC: 140 U/L
ALT SERPL W/O P-5'-P-CCNC: 18 U/L
ANION GAP SERPL CALC-SCNC: 12 MMOL/L
AST SERPL-CCNC: 23 U/L
BASOPHILS # BLD AUTO: 0.06 K/UL
BASOPHILS NFR BLD: 1 %
BILIRUB SERPL-MCNC: 1.6 MG/DL
BUN SERPL-MCNC: 17 MG/DL
CALCIUM SERPL-MCNC: 9.7 MG/DL
CHLORIDE SERPL-SCNC: 97 MMOL/L
CO2 SERPL-SCNC: 26 MMOL/L
CREAT SERPL-MCNC: 1 MG/DL
DIFFERENTIAL METHOD: ABNORMAL
EOSINOPHIL # BLD AUTO: 0.2 K/UL
EOSINOPHIL NFR BLD: 2.8 %
ERYTHROCYTE [DISTWIDTH] IN BLOOD BY AUTOMATED COUNT: 19.7 %
EST. GFR  (AFRICAN AMERICAN): >60 ML/MIN/1.73 M^2
EST. GFR  (NON AFRICAN AMERICAN): >60 ML/MIN/1.73 M^2
GLUCOSE SERPL-MCNC: 79 MG/DL
HCT VFR BLD AUTO: 40.3 %
HGB BLD-MCNC: 12.3 G/DL
IMM GRANULOCYTES # BLD AUTO: 0.02 K/UL
IMM GRANULOCYTES NFR BLD AUTO: 0.3 %
LYMPHOCYTES # BLD AUTO: 1.9 K/UL
LYMPHOCYTES NFR BLD: 33.4 %
MAGNESIUM SERPL-MCNC: 2.1 MG/DL
MCH RBC QN AUTO: 26.1 PG
MCHC RBC AUTO-ENTMCNC: 30.5 G/DL
MCV RBC AUTO: 85 FL
MONOCYTES # BLD AUTO: 0.6 K/UL
MONOCYTES NFR BLD: 10.2 %
NEUTROPHILS # BLD AUTO: 3 K/UL
NEUTROPHILS NFR BLD: 52.3 %
NRBC BLD-RTO: 0 /100 WBC
PLATELET # BLD AUTO: 248 K/UL
PMV BLD AUTO: 9.2 FL
POTASSIUM SERPL-SCNC: 3.2 MMOL/L
PROT SERPL-MCNC: 7.1 G/DL
RBC # BLD AUTO: 4.72 M/UL
SODIUM SERPL-SCNC: 135 MMOL/L
WBC # BLD AUTO: 5.81 K/UL

## 2018-05-25 PROCEDURE — 83735 ASSAY OF MAGNESIUM: CPT | Mod: NTX

## 2018-05-25 PROCEDURE — 94640 AIRWAY INHALATION TREATMENT: CPT | Mod: NTX

## 2018-05-25 PROCEDURE — 99223 1ST HOSP IP/OBS HIGH 75: CPT | Mod: NTX,,, | Performed by: INTERNAL MEDICINE

## 2018-05-25 PROCEDURE — 99233 SBSQ HOSP IP/OBS HIGH 50: CPT | Mod: NTX,,, | Performed by: INTERNAL MEDICINE

## 2018-05-25 PROCEDURE — 25000003 PHARM REV CODE 250: Mod: NTX | Performed by: INTERNAL MEDICINE

## 2018-05-25 PROCEDURE — 99232 SBSQ HOSP IP/OBS MODERATE 35: CPT | Mod: NTX,,, | Performed by: INTERNAL MEDICINE

## 2018-05-25 PROCEDURE — 94664 DEMO&/EVAL PT USE INHALER: CPT | Mod: NTX

## 2018-05-25 PROCEDURE — 25000003 PHARM REV CODE 250: Mod: NTX | Performed by: PHYSICIAN ASSISTANT

## 2018-05-25 PROCEDURE — 25000242 PHARM REV CODE 250 ALT 637 W/ HCPCS: Mod: NTX | Performed by: INTERNAL MEDICINE

## 2018-05-25 PROCEDURE — 36415 COLL VENOUS BLD VENIPUNCTURE: CPT | Mod: NTX

## 2018-05-25 PROCEDURE — 99900035 HC TECH TIME PER 15 MIN (STAT): Mod: NTX

## 2018-05-25 PROCEDURE — A4216 STERILE WATER/SALINE, 10 ML: HCPCS | Mod: NTX | Performed by: INTERNAL MEDICINE

## 2018-05-25 PROCEDURE — 80053 COMPREHEN METABOLIC PANEL: CPT | Mod: NTX

## 2018-05-25 PROCEDURE — 85025 COMPLETE CBC W/AUTO DIFF WBC: CPT | Mod: NTX

## 2018-05-25 PROCEDURE — 63600175 PHARM REV CODE 636 W HCPCS: Mod: NTX | Performed by: INTERNAL MEDICINE

## 2018-05-25 PROCEDURE — 94761 N-INVAS EAR/PLS OXIMETRY MLT: CPT | Mod: NTX

## 2018-05-25 RX ORDER — POTASSIUM CHLORIDE 20 MEQ/1
20 TABLET, EXTENDED RELEASE ORAL ONCE
Status: COMPLETED | OUTPATIENT
Start: 2018-05-25 | End: 2018-05-25

## 2018-05-25 RX ORDER — METOLAZONE 5 MG/1
5 TABLET ORAL DAILY
Status: DISCONTINUED | OUTPATIENT
Start: 2018-05-25 | End: 2018-05-25 | Stop reason: HOSPADM

## 2018-05-25 RX ADMIN — APIXABAN 5 MG: 5 TABLET, FILM COATED ORAL at 12:05

## 2018-05-25 RX ADMIN — FUROSEMIDE 20 MG/HR: 10 INJECTION, SOLUTION INTRAMUSCULAR; INTRAVENOUS at 01:05

## 2018-05-25 RX ADMIN — METOLAZONE 5 MG: 5 TABLET ORAL at 12:05

## 2018-05-25 RX ADMIN — ALPRAZOLAM 1 MG: 1 TABLET ORAL at 09:05

## 2018-05-25 RX ADMIN — ALPRAZOLAM 1 MG: 1 TABLET ORAL at 02:05

## 2018-05-25 RX ADMIN — ALPRAZOLAM 1 MG: 1 TABLET ORAL at 12:05

## 2018-05-25 RX ADMIN — IPRATROPIUM BROMIDE AND ALBUTEROL SULFATE 3 ML: .5; 3 SOLUTION RESPIRATORY (INHALATION) at 08:05

## 2018-05-25 RX ADMIN — POTASSIUM CHLORIDE 20 MEQ: 1500 TABLET, EXTENDED RELEASE ORAL at 09:05

## 2018-05-25 RX ADMIN — ASPIRIN 81 MG: 81 TABLET, COATED ORAL at 09:05

## 2018-05-25 RX ADMIN — METOPROLOL SUCCINATE 12.5 MG: 25 TABLET, EXTENDED RELEASE ORAL at 09:05

## 2018-05-25 RX ADMIN — IPRATROPIUM BROMIDE AND ALBUTEROL SULFATE 3 ML: .5; 3 SOLUTION RESPIRATORY (INHALATION) at 01:05

## 2018-05-25 RX ADMIN — SODIUM CHLORIDE, PRESERVATIVE FREE 3 ML: 5 INJECTION INTRAVENOUS at 02:05

## 2018-05-25 RX ADMIN — ROSUVASTATIN CALCIUM 40 MG: 20 TABLET, FILM COATED ORAL at 09:05

## 2018-05-25 RX ADMIN — TIOTROPIUM BROMIDE 18 MCG: 18 CAPSULE ORAL; RESPIRATORY (INHALATION) at 09:05

## 2018-05-25 RX ADMIN — POTASSIUM CHLORIDE 40 MEQ: 1500 TABLET, EXTENDED RELEASE ORAL at 09:05

## 2018-05-25 RX ADMIN — Medication 800 MG: at 09:05

## 2018-05-25 RX ADMIN — PANTOPRAZOLE SODIUM 40 MG: 40 TABLET, DELAYED RELEASE ORAL at 09:05

## 2018-05-25 RX ADMIN — ACETAMINOPHEN 650 MG: 325 TABLET ORAL at 12:05

## 2018-05-25 RX ADMIN — FUROSEMIDE 20 MG/HR: 10 INJECTION, SOLUTION INTRAMUSCULAR; INTRAVENOUS at 12:05

## 2018-05-25 NOTE — ASSESSMENT & PLAN NOTE
- s/p IR drainage earlier this year  - Appreciate ID consultation; abx stopped and Pulmonology consulted   -No thoracentesis indicated at this time.  Recommending watching patient off abx and repeating CT in 3 weeks.  ID aware and in agreement

## 2018-05-25 NOTE — SUBJECTIVE & OBJECTIVE
Past Medical History:   Diagnosis Date    Anxiety     CAD (coronary artery disease)     Carpal tunnel syndrome     CHF (congestive heart failure)     Hyperlipidemia     Kidney stones     Microscopic hematuria     Pulmonary emboli     S/P coronary artery stent placement     believes 6-7- unsure on amount    Shortness of breath on exertion        Past Surgical History:   Procedure Laterality Date    ABDOMINAL SURGERY      dick fundiplication    defibrilator      HAND SURGERY      left hand index finger tendon repair    heart stents         Review of patient's allergies indicates:   Allergen Reactions    Indomethacin        Medications:  Prescriptions Prior to Admission   Medication Sig    albuterol 2 mg/5 mL syrup Take 5 mLs (2 mg total) by mouth 3 (three) times daily.    alprazolam (XANAX) 2 MG Tab Take by mouth 3 (three) times daily as needed.    amoxicillin-clavulanate 875-125mg (AUGMENTIN) 875-125 mg per tablet Take 1 tablet by mouth every 12 (twelve) hours.    apixaban (ELIQUIS) 5 mg Tab Take 5 mg by mouth 2 (two) times daily.    aspirin (ECOTRIN) 81 MG EC tablet Take 1 tablet (81 mg total) by mouth once daily.    benzonatate (TESSALON) 100 MG capsule Take 100 mg by mouth 3 (three) times daily as needed for Cough.    ciprofloxacin HCl (CIPRO) 500 MG tablet Take 1 tablet (500 mg total) by mouth every 12 (twelve) hours.    CRESTOR 40 mg Tab once daily.     metoprolol succinate (TOPROL-XL) 25 MG 24 hr tablet Take 0.5 tablets (12.5 mg total) by mouth once daily.    NITROSTAT 0.4 mg SL tablet     potassium chloride SA (K-DUR,KLOR-CON) 20 MEQ tablet Take 20 mEq by mouth once daily.    umeclidinium (INCRUSE ELLIPTA) 62.5 mcg/actuation DsDv Inhale 62.5 mcg into the lungs once daily. Controller    docusate sodium (COLACE) 100 MG capsule Take 1 capsule (100 mg total) by mouth 2 (two) times daily.    pantoprazole (PROTONIX) 40 MG tablet Take 1 tablet (40 mg total) by mouth once daily.      Antibiotics     None        Antifungals     None        Antivirals     None             There is no immunization history on file for this patient.    Family History     Problem Relation (Age of Onset)    Diabetes Mother        Social History     Social History    Marital status:      Spouse name: N/A    Number of children: 3    Years of education: GED     Social History Main Topics    Smoking status: Current Some Day Smoker     Packs/day: 0.25     Years: 35.00    Smokeless tobacco: Never Used    Alcohol use No    Drug use: No    Sexual activity: Yes     Partners: Female     Other Topics Concern    None     Social History Narrative    None     Review of Systems   Constitutional: Positive for activity change and fatigue. Negative for appetite change and fever.   HENT: Negative for congestion and dental problem.    Respiratory: Positive for cough. Negative for choking, chest tightness, shortness of breath and wheezing.    Cardiovascular: Negative for chest pain.   Gastrointestinal: Negative for abdominal distention and abdominal pain.   Genitourinary: Negative for difficulty urinating.   Musculoskeletal: Negative for arthralgias.   Skin: Negative for color change.   Psychiatric/Behavioral: Negative for agitation.     Objective:     Vital Signs (Most Recent):  Temp: 98 °F (36.7 °C) (05/25/18 0940)  Pulse: (!) 55 (05/25/18 0951)  Resp: 18 (05/25/18 0951)  BP: 109/71 (05/25/18 0940)  SpO2: 97 % (05/25/18 0940) Vital Signs (24h Range):  Temp:  [97.1 °F (36.2 °C)-98.1 °F (36.7 °C)] 98 °F (36.7 °C)  Pulse:  [] 55  Resp:  [16-25] 18  SpO2:  [95 %-99 %] 97 %  BP: ()/(58-89) 109/71     Weight: 88.1 kg (194 lb 3.6 oz)  Body mass index is 27.09 kg/m².    Estimated Creatinine Clearance: 92 mL/min (based on SCr of 1 mg/dL).    Physical Exam   Constitutional: He is oriented to person, place, and time. He appears well-developed and well-nourished.   HENT:   Head: Normocephalic.   Eyes: Pupils are  equal, round, and reactive to light. Right eye exhibits no discharge. Left eye exhibits no discharge.   Neck: No JVD present.   Cardiovascular: Normal rate and regular rhythm.    Pulmonary/Chest: Effort normal. He has no wheezes.   Decrease air entry bilateral.    Abdominal: Soft. He exhibits no distension. There is no tenderness.   Musculoskeletal: He exhibits no edema.   Neurological: He is alert and oriented to person, place, and time.   Skin: Skin is warm.   Vitals reviewed.      Significant Labs:   CBC:   Recent Labs  Lab 05/24/18  0538 05/25/18  0556   WBC 5.19 5.81   HGB 12.8* 12.3*   HCT 41.1 40.3    248     CMP:   Recent Labs  Lab 05/23/18  1513 05/24/18  0538 05/25/18  0556    139 135*   K 3.0* 3.0* 3.2*   CL 93* 98 97   CO2 35* 31* 26    83 79   BUN 17 14 17   CREATININE 0.9 0.8 1.0   CALCIUM 9.7 9.8 9.7   PROT  --  6.8 7.1   ALBUMIN  --  3.3* 3.5   BILITOT  --  1.3* 1.6*   ALKPHOS  --  143* 140*   AST  --  21 23   ALT  --  17 18   ANIONGAP 9 10 12   EGFRNONAA >60.0 >60.0 >60.0     Significant Imaging: I have reviewed all pertinent imaging results/findings within the past 24 hours.

## 2018-05-25 NOTE — PROGRESS NOTES
[facility]  Heart Transplant  Progress Note    Patient Name: Da Cruz  MRN: [id number]  Admission Date: 5/21/2018  Hospital Length of Stay: 4 days  Attending Physician: João Kessler MD  Primary Care Provider: Pool Zambrano Iii, MD  Principal Problem:Acute on chronic combined systolic and diastolic heart failure    Subjective:     Interval History: Patient reports no complaints overnight.  He continues to urinate a lot.  Pulmonology consulted and felt that no thoracentesis necessary.  Discussed with ID and they are in agreement.  Patient is net negative 2L over last 24 hours and weight is down to 194lbs from 206lbs on admission; however, remains volume overloaded.     Continuous Infusions:   furosemide (LASIX) 2 mg/mL infusion (non-titrating) 20 mg/hr (05/25/18 0136)     Scheduled Meds:   albuterol-ipratropium  3 mL Nebulization Q6H WAKE    apixaban  5 mg Oral BID    aspirin  81 mg Oral Daily    docusate sodium  100 mg Oral BID    magnesium oxide  800 mg Oral Daily    metOLazone  5 mg Oral Daily    metoprolol succinate  12.5 mg Oral Daily    pantoprazole  40 mg Oral Daily    potassium chloride SA  40 mEq Oral BID    rosuvastatin  40 mg Oral Daily    sodium chloride 0.9%  3 mL Intravenous Q8H    tiotropium  1 capsule Inhalation Daily     PRN Meds:acetaminophen, ALPRAZolam, benzonatate, ramelteon    Review of patient's allergies indicates:   Allergen Reactions    Indomethacin      Objective:     Vital Signs (Most Recent):  Temp: 98 °F (36.7 °C) (05/25/18 0940)  Pulse: 89 (05/25/18 1000)  Resp: 18 (05/25/18 0951)  BP: 109/71 (05/25/18 0940)  SpO2: 97 % (05/25/18 0940) Vital Signs (24h Range):  Temp:  [97.1 °F (36.2 °C)-98.1 °F (36.7 °C)] 98 °F (36.7 °C)  Pulse:  [] 89  Resp:  [16-25] 18  SpO2:  [95 %-99 %] 97 %  BP: ()/(60-89) 109/71     Patient Vitals for the past 72 hrs (Last 3 readings):   Weight   05/25/18 0700 88.1 kg (194 lb 3.6 oz)   05/24/18 0716 90.3  "kg (199 lb)     Body mass index is 27.09 kg/m².    Intake/Output Summary (Last 24 hours) at 05/25/18 1209  Last data filed at 05/25/18 0945   Gross per 24 hour   Intake             1184 ml   Output             3100 ml   Net            -1916 ml     Physical Exam    Gen: NAD. Resting comfortably in bed  HEENT: NC/AT. PERRL  Neck: JVD elevated 6cm above clavicle. Supple  Heart: Regular with mild tachycardia. +S3 heena. No murmrus heard  Lungs: Scant bibasilar rales with good air movement. Decrease breath sound RLL  Abd: Soft. Non tender. Mild distention with no fluid wave. Normal BS.   Ext: Bilateral LE edema. Warm distally  Neuro: AAO x 3. NO focal deficits   Vitals reviewed    Significant Labs:  CBC:    Recent Labs  Lab 05/23/18  0558 05/24/18  0538 05/25/18  0556   WBC 5.88 5.19 5.81   RBC 4.68 4.84 4.72   HGB 12.3* 12.8* 12.3*   HCT 39.8* 41.1 40.3    229 248   MCV 85 85 85   MCH 26.3* 26.4* 26.1*   MCHC 30.9* 31.1* 30.5*     BNP:    Recent Labs  Lab 05/21/18  0716   BNP 3,094*     CMP:    Recent Labs  Lab 05/23/18  0558 05/23/18  1513 05/24/18  0538 05/25/18  0556   GLU 99 107 83 79   CALCIUM 9.7 9.7 9.8 9.7   ALBUMIN 3.3*  --  3.3* 3.5   PROT 6.8  --  6.8 7.1    137 139 135*   K 3.0* 3.0* 3.0* 3.2*   CO2 29 35* 31* 26   CL 96 93* 98 97   BUN 18 17 14 17   CREATININE 0.9 0.9 0.8 1.0   ALKPHOS 147*  --  143* 140*   ALT 20  --  17 18   AST 22  --  21 23   BILITOT 1.4*  --  1.3* 1.6*      I have reviewed all pertinent labs within the past 24 hours.    Estimated Creatinine Clearance: 92 mL/min (based on SCr of 1 mg/dL).    Diagnostic Results:  CXR: X-ray Chest Pa And Lateral: "Pacemaker identified.  Mild cardiomegaly.  Ill-defined opacification identified at the right lung base posteriorly consistent with airspace consolidation or atelectasis.  Small amount of right-sided pleural effusion also noted.  The left lung is clear"    Assessment and Plan:     "Mr. Cruz is a 53 y.o. year old White male who " has presents to be considered for advanced surgical options (LVAD/OHT).      Patient presents after several admissions for various issues including ADHF and infection since January of this year. He states he has had ICM, s/p LAD and RCA PCI following MI in 2009, after which he initially did quite well, however the last 7 months or so has had dramatic decline in how he is doing. Was smoking and drinking (not drinking very heavily at all), but smoking up until January of this year. Still has cigarettes once in a while, however not recently. No ETOH at all anymore. January had an admisson for ADHF, was subsequently discharged home. February developed unilateral leg swelling of left LE, with subsequently described bilateral submassive pulmonary embolism (CTscan of chest with IV contrast last taken 05/07/18 at [facility] demonstrates residual PE in right middle and right lower pulmonary artery). He was placed on eliquis after this. Subsequently had admission to [facility] for MRSA pneumonia, appears from notes that he left AMA without completing treatment, patient states that he actually has left 3 different hospitals AMA due to not feeling he was getting appropriate/adequate care, and he feels he has continued to decline. Got into a little argument with his mother about this at the time he told me this.      He is coughing a significant amount, increased since the most recent admission he had at [facility], left the hospital 05/16/18, where he was admitted for 3 days for extreme hypotension and what patient describes as hypovolemia. He was admitted to the ICU with hypotension of SBP to 60s, he had recently been seen in clinic where his systolics had been stable in the 80s. He was started on IV levophed, had significant wheezing, CT chest with suspicion for empyema and/or lung abscess, unchanged, opacities in left lingula unchanged as well. Appears uncertain that he received significant fluids in hospital,  "however did have lasix held until he could follow up with us, was started on metoprolol 12.5mg po daily as well, and per pulmonary was recommended 1 month of oral abx treatment with no indication for VATS.       Additionally he has increased abdominal swelling since discharge, although he had his mother state he has had significantly more abdominal swelling in the past.      CT Chest non-contrast: 05/14/18:  1. The drainage catheter is been removed  2. Right basilar pleural collection and lung consolidation suspicious for empyema and/or lung abscess unchanged  3. Opacities left lingula unchanged with nothing new     Most recent CTA of his chest appears to be from 05/04/18 at [facility] in care everywhere:  Outside CT scan without a report is available dated April 19, 2018. Patient also has a history of lung abscess and pulmonary embolus.  Study is degraded by motion artifact. Volume loss and scarring of the left lung base is identified. There is a cavitary lesion at the right lung base which appears to be decreased in size when compared to the prior study. This lesion currently measures 2.8 cm in greatest dimension compared to prior measurement of 4.7 cm. There is adjacent infiltration with pleural reaction. The bony structures demonstrate degenerative changes but nothing acute.  There is persistent thrombus within the right lower lobe pulmonary artery and possibly within the left upper lobe pulmonary artery. There is considerable contrast material within the inferior vena cava and right heart consistent with right heart strain. There is left atrial enlargement.     03/01/18 TTE (Dr. Nicholas):  LVEDD 7cm, LVEF 10%  TR mild-mod, peak TV gradient 21 mm Hg.  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         Empyema lung    - s/p IR drainage earlier this year  - Appreciate ID consultation; abx stopped and Pulmonology consulted   -No thoracentesis indicated at this time.  Recommending watching patient off abx and repeating CT in 3 weeks.  ID aware and in agreement            Pulmonary embolism    - Continue apixaban 5mg BID  - CT chest 5/23 reviewed   - TTE reveiwed.           Coronary artery disease involving native coronary artery    - Continue ASA / Statin         Anxiety    - Continue low dose ativan TID PRN               MATIAS Merino  Heart Transplant  [facility]

## 2018-05-25 NOTE — PROGRESS NOTES
D/C note:    SW to pt's room for possible weekend D/C. Pt aaox4 and communicative. Pt reports he is hoping to D/C soon. Pt reports son will provide transport at D/C. Pt reports no questions for SW at this time. SW providing ongoing psychosocial and counseling support, education, assistance, resources, and discharge planning as indicated. SW continuing to follow and remains available.

## 2018-05-25 NOTE — ASSESSMENT & PLAN NOTE
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

## 2018-05-25 NOTE — SUBJECTIVE & OBJECTIVE
Interval History: Patient reports no complaints overnight.  He continues to urinate a lot.  Pulmonology consulted and felt that no thoracentesis necessary.  Discussed with ID and they are in agreement.  Patient is net negative 2L over last 24 hours and weight is down to 194lbs from 206lbs on admission; however, remains volume overloaded.     Continuous Infusions:   furosemide (LASIX) 2 mg/mL infusion (non-titrating) 20 mg/hr (05/25/18 0136)     Scheduled Meds:   albuterol-ipratropium  3 mL Nebulization Q6H WAKE    apixaban  5 mg Oral BID    aspirin  81 mg Oral Daily    docusate sodium  100 mg Oral BID    magnesium oxide  800 mg Oral Daily    metOLazone  5 mg Oral Daily    metoprolol succinate  12.5 mg Oral Daily    pantoprazole  40 mg Oral Daily    potassium chloride SA  40 mEq Oral BID    rosuvastatin  40 mg Oral Daily    sodium chloride 0.9%  3 mL Intravenous Q8H    tiotropium  1 capsule Inhalation Daily     PRN Meds:acetaminophen, ALPRAZolam, benzonatate, ramelteon    Review of patient's allergies indicates:   Allergen Reactions    Indomethacin      Objective:     Vital Signs (Most Recent):  Temp: 98 °F (36.7 °C) (05/25/18 0940)  Pulse: 89 (05/25/18 1000)  Resp: 18 (05/25/18 0951)  BP: 109/71 (05/25/18 0940)  SpO2: 97 % (05/25/18 0940) Vital Signs (24h Range):  Temp:  [97.1 °F (36.2 °C)-98.1 °F (36.7 °C)] 98 °F (36.7 °C)  Pulse:  [] 89  Resp:  [16-25] 18  SpO2:  [95 %-99 %] 97 %  BP: ()/(60-89) 109/71     Patient Vitals for the past 72 hrs (Last 3 readings):   Weight   05/25/18 0700 88.1 kg (194 lb 3.6 oz)   05/24/18 0716 90.3 kg (199 lb)     Body mass index is 27.09 kg/m².    Intake/Output Summary (Last 24 hours) at 05/25/18 1209  Last data filed at 05/25/18 0945   Gross per 24 hour   Intake             1184 ml   Output             3100 ml   Net            -1916 ml     Physical Exam    Gen: NAD. Resting comfortably in bed  HEENT: NC/AT. PERRL  Neck: JVD elevated 6cm above clavicle.  "Supple  Heart: Regular with mild tachycardia. +S3 heena. No murmrus heard  Lungs: Scant bibasilar rales with good air movement. Decrease breath sound RLL  Abd: Soft. Non tender. Mild distention with no fluid wave. Normal BS.   Ext: Bilateral LE edema. Warm distally  Neuro: AAO x 3. NO focal deficits   Vitals reviewed    Significant Labs:  CBC:    Recent Labs  Lab 05/23/18  0558 05/24/18  0538 05/25/18  0556   WBC 5.88 5.19 5.81   RBC 4.68 4.84 4.72   HGB 12.3* 12.8* 12.3*   HCT 39.8* 41.1 40.3    229 248   MCV 85 85 85   MCH 26.3* 26.4* 26.1*   MCHC 30.9* 31.1* 30.5*     BNP:    Recent Labs  Lab 05/21/18  0716   BNP 3,094*     CMP:    Recent Labs  Lab 05/23/18  0558 05/23/18  1513 05/24/18  0538 05/25/18  0556   GLU 99 107 83 79   CALCIUM 9.7 9.7 9.8 9.7   ALBUMIN 3.3*  --  3.3* 3.5   PROT 6.8  --  6.8 7.1    137 139 135*   K 3.0* 3.0* 3.0* 3.2*   CO2 29 35* 31* 26   CL 96 93* 98 97   BUN 18 17 14 17   CREATININE 0.9 0.9 0.8 1.0   ALKPHOS 147*  --  143* 140*   ALT 20  --  17 18   AST 22  --  21 23   BILITOT 1.4*  --  1.3* 1.6*      I have reviewed all pertinent labs within the past 24 hours.    Estimated Creatinine Clearance: 92 mL/min (based on SCr of 1 mg/dL).    Diagnostic Results:  CXR: X-ray Chest Pa And Lateral: "Pacemaker identified.  Mild cardiomegaly.  Ill-defined opacification identified at the right lung base posteriorly consistent with airspace consolidation or atelectasis.  Small amount of right-sided pleural effusion also noted.  The left lung is clear"  "

## 2018-05-25 NOTE — CONSULTS
[facility]  Infectious Disease  Consult Note    Patient Name: Da Cruz  MRN: [id number]  Admission Date: 5/21/2018  Hospital Length of Stay: 4 days  Attending Physician: João Kessler MD  Primary Care Provider: Pool Zambrano Iii, MD     Isolation Status: No active isolations    Patient information was obtained from patient, past medical records and ER records.      Consults  Assessment/Plan:     Empyema lung    This is Mr. Da Cruz, 53 year old male with PMHx significant for CAD S/P coronary artery stent placement resulted in HFrEF 10-15%, multiple pulmonary emboli. ID consulted for duration of antibiotics of left pleural effusion/empyema.    Interval history:  Similar complain, continue to have cough, antibiotics stopped by primary team and plan for Pulmonaru consult.     Recommendations:   - Complicated course of his empyema secondary to his medication noncompliance, leaving hospital AMA before completing work up  - No plan for Thoracocentesis from pulmonary standpoint.  - Discontinue antibiotics              Thank you for your consult. I will sign off. Please contact us if you have any additional questions.    Buster Watson MD  Infectious Disease  [facility]    Subjective:     Principal Problem: Acute on chronic combined systolic and diastolic heart failure    HPI: This is Mr. Da Cruz, 53 year old male with PMHx significant for CAD S/P coronary artery stent placement resulted in HFrEF 10-15%, multiple pulmonary emboli. His story for empyema oes back to 3/2018 when he had sub-massive PE and resulted in large pleural effusion (Dx at [facility]) then transferred to [facility] for surgical intervention (Chest tube placement and > 2 Liter drained) with no micro or culture sent. Sputum culture grew MRSA (Sensitive to Cipro) and he was discharged with Cipro and Augmentin for 2 weeks. subsequent X rays of chest showed improvement in  pleural effusion. Around 4/2018, he was noted to have nausea and feeling sick, in ED at  he underwent CT scan showed signes of fluid collection, however the patient refuse to do any surgical intervention and went to [facility] where pulmonary recommended CT-guided placement of a pigtail catheter for the persistent, unresolved effusion. Chest Pigtail placed twice dueing late 4/2018 admission, fluid analysis was Exudative, Augmentin 875 bid and Cipro 500 BID. Readmitting 5/12-5/16 with similar complain and pulmonary consulted and they recommend 4 - 6 weeks of antibiotics Cipro and Augmentin.    Past Medical History:   Diagnosis Date    Anxiety     CAD (coronary artery disease)     Carpal tunnel syndrome     CHF (congestive heart failure)     Hyperlipidemia     Kidney stones     Microscopic hematuria     Pulmonary emboli     S/P coronary artery stent placement     believes 6-7- unsure on amount    Shortness of breath on exertion        Past Surgical History:   Procedure Laterality Date    ABDOMINAL SURGERY      dick fundiplication    defibrilator      HAND SURGERY      left hand index finger tendon repair    heart stents         Review of patient's allergies indicates:   Allergen Reactions    Indomethacin        Medications:  Prescriptions Prior to Admission   Medication Sig    albuterol 2 mg/5 mL syrup Take 5 mLs (2 mg total) by mouth 3 (three) times daily.    alprazolam (XANAX) 2 MG Tab Take by mouth 3 (three) times daily as needed.    amoxicillin-clavulanate 875-125mg (AUGMENTIN) 875-125 mg per tablet Take 1 tablet by mouth every 12 (twelve) hours.    apixaban (ELIQUIS) 5 mg Tab Take 5 mg by mouth 2 (two) times daily.    aspirin (ECOTRIN) 81 MG EC tablet Take 1 tablet (81 mg total) by mouth once daily.    benzonatate (TESSALON) 100 MG capsule Take 100 mg by mouth 3 (three) times daily as needed for Cough.    ciprofloxacin HCl (CIPRO) 500 MG tablet Take 1 tablet (500 mg total) by mouth every  12 (twelve) hours.    CRESTOR 40 mg Tab once daily.     metoprolol succinate (TOPROL-XL) 25 MG 24 hr tablet Take 0.5 tablets (12.5 mg total) by mouth once daily.    NITROSTAT 0.4 mg SL tablet     potassium chloride SA (K-DUR,KLOR-CON) 20 MEQ tablet Take 20 mEq by mouth once daily.    umeclidinium (INCRUSE ELLIPTA) 62.5 mcg/actuation DsDv Inhale 62.5 mcg into the lungs once daily. Controller    docusate sodium (COLACE) 100 MG capsule Take 1 capsule (100 mg total) by mouth 2 (two) times daily.    pantoprazole (PROTONIX) 40 MG tablet Take 1 tablet (40 mg total) by mouth once daily.     Antibiotics     None        Antifungals     None        Antivirals     None             There is no immunization history on file for this patient.    Family History     Problem Relation (Age of Onset)    Diabetes Mother        Social History     Social History    Marital status:      Spouse name: N/A    Number of children: 3    Years of education: GED     Social History Main Topics    Smoking status: Current Some Day Smoker     Packs/day: 0.25     Years: 35.00    Smokeless tobacco: Never Used    Alcohol use No    Drug use: No    Sexual activity: Yes     Partners: Female     Other Topics Concern    None     Social History Narrative    None     Review of Systems   Constitutional: Positive for activity change and fatigue. Negative for appetite change and fever.   HENT: Negative for congestion and dental problem.    Respiratory: Positive for cough. Negative for choking, chest tightness, shortness of breath and wheezing.    Cardiovascular: Negative for chest pain.   Gastrointestinal: Negative for abdominal distention and abdominal pain.   Genitourinary: Negative for difficulty urinating.   Musculoskeletal: Negative for arthralgias.   Skin: Negative for color change.   Psychiatric/Behavioral: Negative for agitation.     Objective:     Vital Signs (Most Recent):  Temp: 98 °F (36.7 °C) (05/25/18 0940)  Pulse: (!) 55  (05/25/18 0951)  Resp: 18 (05/25/18 0951)  BP: 109/71 (05/25/18 0940)  SpO2: 97 % (05/25/18 0940) Vital Signs (24h Range):  Temp:  [97.1 °F (36.2 °C)-98.1 °F (36.7 °C)] 98 °F (36.7 °C)  Pulse:  [] 55  Resp:  [16-25] 18  SpO2:  [95 %-99 %] 97 %  BP: ()/(58-89) 109/71     Weight: 88.1 kg (194 lb 3.6 oz)  Body mass index is 27.09 kg/m².    Estimated Creatinine Clearance: 92 mL/min (based on SCr of 1 mg/dL).    Physical Exam   Constitutional: He is oriented to person, place, and time. He appears well-developed and well-nourished.   HENT:   Head: Normocephalic.   Eyes: Pupils are equal, round, and reactive to light. Right eye exhibits no discharge. Left eye exhibits no discharge.   Neck: No JVD present.   Cardiovascular: Normal rate and regular rhythm.    Pulmonary/Chest: Effort normal. He has no wheezes.   Decrease air entry bilateral.    Abdominal: Soft. He exhibits no distension. There is no tenderness.   Musculoskeletal: He exhibits no edema.   Neurological: He is alert and oriented to person, place, and time.   Skin: Skin is warm.   Vitals reviewed.        Significant Labs:   CBC:   Recent Labs  Lab 05/24/18  0538 05/25/18  0556   WBC 5.19 5.81   HGB 12.8* 12.3*   HCT 41.1 40.3    248     CMP:   Recent Labs  Lab 05/23/18  1513 05/24/18  0538 05/25/18  0556    139 135*   K 3.0* 3.0* 3.2*   CL 93* 98 97   CO2 35* 31* 26    83 79   BUN 17 14 17   CREATININE 0.9 0.8 1.0   CALCIUM 9.7 9.8 9.7   PROT  --  6.8 7.1   ALBUMIN  --  3.3* 3.5   BILITOT  --  1.3* 1.6*   ALKPHOS  --  143* 140*   AST  --  21 23   ALT  --  17 18   ANIONGAP 9 10 12   EGFRNONAA >60.0 >60.0 >60.0     Significant Imaging: I have reviewed all pertinent imaging results/findings within the past 24 hours.

## 2018-05-25 NOTE — PLAN OF CARE
Problem: Patient Care Overview  Goal: Plan of Care Review  Outcome: Ongoing (interventions implemented as appropriate)  POC reviewed with patient. VSS and pt denies presence of pain. Lasix gtt infusing per order. Fall bundle implemented and patient has remained free of falls and injuries. Patient in no apparent sign of distress, will continue to monitor.

## 2018-05-25 NOTE — DISCHARGE SUMMARY
[facility]  Heart Transplant  Discharge Summary      Patient Name: Da Cruz  MRN: [id number]  Admission Date: 5/21/2018  Hospital Length of Stay: 4 days  Discharge Date and Time: 05/25/2018 3:54 PM  Attending Physician: João Kessler MD   Discharging Provider: MATIAS Merino  Primary Care Provider: Pool Zambrano Iii, MD     HPI: 53 y.o. year old White male who has presents to be considered for advanced surgical options (LVAD/OHT).    Patient presents after several admissions for various issues including ADHF and infection since January of this year. He states he has had ICM, s/p LAD and RCA PCI following MI in 2009, after which he initially did quite well, however the last 7 months or so has had dramatic decline in how he is doing. Was smoking and drinking (not drinking very heavily at all), but smoking up until January of this year. Still has cigarettes once in a while, however not recently. No ETOH at all anymore. January had an admisson for ADHF, was subsequently discharged home. February developed unilateral leg swelling of left LE, with subsequently described bilateral submassive pulmonary embolism (CTscan of chest with IV contrast last taken 05/07/18 at [facility] demonstrates residual PE in right middle and right lower pulmonary artery). He was placed on eliquis after this. Subsequently had admission to [facility] for MRSA pneumonia, appears from notes that he left AMA without completing treatment, patient states that he actually has left 3 different hospitals AMA due to not feeling he was getting appropriate/adequate care, and he feels he has continued to decline. Got into a little argument with his mother about this at the time he told me this.    He is coughing a significant amount, increased since the most recent admission he had at [facility], left the hospital 05/16/18, where he was admitted for 3 days for extreme hypotension and what patient describes as  hypovolemia. He was admitted to the ICU with hypotension of SBP to 60s, he had recently been seen in clinic where his systolics had been stable in the 80s. He was started on IV levophed, had significant wheezing, CT chest with suspicion for empyema and/or lung abscess, unchanged, opacities in left lingula unchanged as well. Appears uncertain that he received significant fluids in hospital, however did have lasix held until he could follow up with us, was started on metoprolol 12.5mg po daily as well, and per pulmonary was recommended 1 month of oral abx treatment with no indication for VATS.         * No surgery found *     Hospital Course: Patient was admitted to the [facility] service and placed on 24 hour telemetry   Upon admit, patient was started on Lasix drip and diuresed well; metolazone was added with plan to transition from IV Lasix to po Lasix once he was euvolemic.  However, patient left against medical advice while he was on Lasix infusion.  Throughout the hospital stay; he was net negative 9L throughout hospital stay and weight was 194lbs down from 206lbs on admit.   CTA of chest was done and demonstrated  un resolving PE and suspicious for empyema of the right pleural effusion. Infectious disease and Pulmonology were consulted and plan was to stop abx and repeat CT on 3 weeks.  Pulmonology did not feel that thoracentesis was indicated from pulmonary standpoint.     Patient was educated that leaving AMA; he was refusing advanced options treatment and possible workup in the future.  He signed AMA forms     Consults         Status Ordering Provider     Inpatient consult to Infectious Diseases  Once     Provider:  (Not yet assigned)    Completed ROBEL EDWARDS     Inpatient consult to Pulmonology  Once     Provider:  (Not yet assigned)    Acknowledged ARNOLD DE LA TORRE          Significant Diagnostic Studies: see report for full details  CXR: 5/21/18  2D echo with CFD: 5/22/18  CTA of chest: 5/23/18    Pending  Diagnostic Studies:     None        Final Active Diagnoses:    Diagnosis Date Noted POA    PRINCIPAL PROBLEM:  Acute on chronic combined systolic and diastolic heart failure [I50.43] 02/28/2018 Yes    Empyema lung [J86.9] 05/21/2018 Yes    Pulmonary embolism [I26.99] 03/01/2018 Yes    Coronary artery disease involving native coronary artery [I25.10] 12/31/2015 Yes    Anxiety [F41.9] 12/31/2015 Yes    History of implantable cardioverter-defibrillator (ICD) placement [Z95.810] 01/07/2016 Yes      Problems Resolved During this Admission:    Diagnosis Date Noted Date Resolved POA      Discharged Condition: against medical advice    Disposition: Left Against Medical Adv*    Follow Up:    Patient Instructions:     Activity as tolerated       Medications:  Medications were not reconciled as patient left AMA    MATIAS Merino  Heart Transplant  [facility]

## 2018-05-25 NOTE — ASSESSMENT & PLAN NOTE
This is Mr. Da Cruz, 53 year old male with PMHx significant for CAD S/P coronary artery stent placement resulted in HFrEF 10-15%, multiple pulmonary emboli. ID consulted for duration of antibiotics of left pleural effusion/empyema.    Interval history:  Similar complain, continue to have cough, antibiotics stopped by primary team and plan for Pulmonaru consult.     Recommendations:   - Complicated course of his empyema secondary to his medication noncompliance, leaving hospital AMA before completing work up  - Follow pulmonary consult for Thoracocentesis and send for analysis for LDH, cell count, culture, gram stain, AFB stain   - Continue holding antibiotics for now, and based onThoracocentesis results, might need Vancomycin instead.

## 2018-05-25 NOTE — NURSING
Patient very frustrated and cursing stated he wanted to be taken off Lasix gtt and he's going home. Ileana SALCEDO notified. Informed patient about AMA form. Patient signed form. IV and tele removed from patient. Will continue to monitor.

## 2018-05-25 NOTE — CONSULTS
"Consult Note  Pulmonary Medicine    SUBJECTIVE     Reason for consult: abnormal CT    History of Present Illness  [facility] is consulted for abnormal CT in this 53-yo male admitted four days ago to the heart failure service. Pt has an extensive heart disease history and has had multiple recent admissions for exacerbations. Earlier this year he was found to have a right posterior empyema. He underwent placement of two chest tubes, one at [place] and one here in early March. Repeat CT this week showed residual changes concerning for persistent empyema. Pt says that he has had no s/s of infection in recent weeks, only SOB due to "too much fluid".     Past Medical History:   Diagnosis Date    Anxiety     CAD (coronary artery disease)     Carpal tunnel syndrome     CHF (congestive heart failure)     Hyperlipidemia     Kidney stones     Microscopic hematuria     Pulmonary emboli     S/P coronary artery stent placement     believes 6-7- unsure on amount    Shortness of breath on exertion        Past Surgical History:   Procedure Laterality Date    ABDOMINAL SURGERY      dick fundiplication    defibrilator      HAND SURGERY      left hand index finger tendon repair    heart stents         Family History   Problem Relation Age of Onset    Diabetes Mother        Social History     Social History    Marital status:      Spouse name: N/A    Number of children: 3    Years of education: GED     Social History Main Topics    Smoking status: Current Some Day Smoker     Packs/day: 0.25     Years: 35.00    Smokeless tobacco: Never Used    Alcohol use No    Drug use: No    Sexual activity: Yes     Partners: Female     Other Topics Concern    None     Social History Narrative    None       Current Facility-Administered Medications   Medication Dose Route Frequency Provider Last Rate Last Dose    acetaminophen tablet 650 mg  650 mg Oral Q6H PRN Reed Gamble, DO   650 mg at 05/25/18 0048    " albuterol-ipratropium 2.5 mg-0.5 mg/3 mL nebulizer solution 3 mL  3 mL Nebulization Q6H WAKE Reed Gamble, DO   3 mL at 05/25/18 1300    ALPRAZolam tablet 1 mg  1 mg Oral TID PRN Reed Gamble DO   1 mg at 05/25/18 1448    apixaban tablet 5 mg  5 mg Oral BID MATIAS Merino   5 mg at 05/25/18 1226    aspirin EC tablet 81 mg  81 mg Oral Daily Reed Gamble, DO   81 mg at 05/25/18 0950    benzonatate capsule 100 mg  100 mg Oral TID PRN Reed Gamble, DO        docusate sodium capsule 100 mg  100 mg Oral BID Reed Gamble, DO   100 mg at 05/24/18 2122    furosemide (LASIX) 2 mg/mL in sodium chloride 0.9% 100 mL infusion (conc: 2 mg/mL)  20 mg/hr Intravenous Continuous Reed Gamble DO 10 mL/hr at 05/25/18 1227 20 mg/hr at 05/25/18 1227    magnesium oxide tablet 800 mg  800 mg Oral Daily Reed Gamble, DO   800 mg at 05/25/18 0949    metOLazone tablet 5 mg  5 mg Oral Daily MATIAS Merino   5 mg at 05/25/18 1226    metoprolol succinate (TOPROL-XL) 24 hr split tablet 12.5 mg  12.5 mg Oral Daily Reed Gamble DO   12.5 mg at 05/25/18 0950    pantoprazole EC tablet 40 mg  40 mg Oral Daily Reed Gamble, DO   40 mg at 05/25/18 0950    potassium chloride SA CR tablet 40 mEq  40 mEq Oral BID Reed Gamble, DO   40 mEq at 05/25/18 0950    ramelteon tablet 8 mg  8 mg Oral Nightly PRN João Kessler MD   8 mg at 05/24/18 2122    rosuvastatin tablet 40 mg  40 mg Oral Daily Reed Gamble, DO   40 mg at 05/25/18 0950    sodium chloride 0.9% flush 3 mL  3 mL Intravenous Q8H Reed Gamble DO   3 mL at 05/25/18 1448    tiotropium inhalation capsule 18 mcg  1 capsule Inhalation Daily Reed Gamble DO   18 mcg at 05/25/18 0951       Review of patient's allergies indicates:   Allergen Reactions    Indomethacin          Review of Systems  As above. Additionally:   Constitutional: Negative for fever, diaphoresis, activity change, appetite change and fatigue.   HENT: Negative for congestion,  postnasal drip, rhinorrhea, sinus pressure, sore throat, trouble swallowing and voice change.    Eyes: Negative for photophobia, pain and visual disturbance.   Respiratory: Negative for cough, shortness of breath and wheezing.    Cardiovascular: Negative for chest pain, palpitations and leg swelling.   Gastrointestinal: Negative for nausea, vomiting, abdominal pain, diarrhea, constipation and abdominal distention.   Endocrine: Negative for polydipsia, polyphagia and polyuria.   Genitourinary: Negative for dysuria, frequency, hematuria and difficulty urinating.   Musculoskeletal: Negative for back pain, arthralgias, neck pain and neck stiffness.   Skin: Negative for color change, pallor, rash and wound.   Neurological: Negative for dizziness, seizures, syncope, weakness and headaches.   Hematological: Negative for adenopathy. Does not bruise/bleed easily.   Psychiatric/Behavioral: Negative for sleep disturbance and dysphoric mood. The patient is not nervous/anxious.          OBJECTIVE     Vitals:    05/25/18 1300   BP:    Pulse: 62   Resp: 18   Temp:        Physical Exam  Constitutional: Patient is oriented to person, place, and time. Patient appears well-developed and well-nourished. No distress.   HENT: Head: Normocephalic and atraumatic. Right Ear: External ear normal. Left Ear: External ear normal. Nose: Nose normal.   Mouth/Throat: Oropharynx is clear and moist. No oropharyngeal exudate.   Eyes: Conjunctivae and EOM are normal. Right eye exhibits no discharge. Left eye exhibits no discharge. No scleral icterus.   Neck: Normal range of motion. Neck supple. No JVD present. No tracheal deviation present. No thyromegaly present.   Cardiovascular: Normal rate, regular rhythm, normal heart sounds and intact distal pulses.  Exam reveals no gallop and no friction rub.  No murmur heard.  Pulmonary/Chest: No respiratory distress. Effort normal and breath sounds normal. Patient has no wheezes. Patient has no rales.    Abdominal: Soft. Bowel sounds are normal. Patient exhibits no distension and no mass. There is no tenderness.   Musculoskeletal: Normal range of motion. Patient exhibits no edema or tenderness.   Lymphadenopathy:   Patient has no cervical adenopathy.   Neurological: Patient is alert and oriented to person, place, and time. Patient has normal reflexes. Patient exhibits normal muscle tone. Coordination normal.   Skin: Skin is warm and dry. No rash noted. Patient is not diaphoretic. No erythema. No pallor.   Psychiatric: Patient has a normal mood and affect. Behavior is normal. Judgment and thought content normal.       Laboratory  No results for input(s): INR in the last 168 hours.      Recent Labs  Lab 05/23/18  0558 05/24/18  0538 05/25/18  0556   WBC 5.88 5.19 5.81   HGB 12.3* 12.8* 12.3*   HCT 39.8* 41.1 40.3    229 248         Recent Labs  Lab 05/23/18  0558 05/23/18  1513 05/24/18  0538 05/25/18  0556    137 139 135*   K 3.0* 3.0* 3.0* 3.2*   CL 96 93* 98 97   CO2 29 35* 31* 26   BUN 18 17 14 17   CREATININE 0.9 0.9 0.8 1.0   CALCIUM 9.7 9.7 9.8 9.7   PROT 6.8  --  6.8 7.1   BILITOT 1.4*  --  1.3* 1.6*   ALKPHOS 147*  --  143* 140*   ALT 20  --  17 18   AST 22  --  21 23     No results for input(s): PH, PCO2, PO2, HCO3, POCSATURATED, BE in the last 24 hours.      Diagnostic Results: Reviewed        Assessment / Recommendations     Empyema of lung     - CT shows residual changes concerning for potential nidus of infection in this patient being considered for LVAD placement  - however, no s/s compelling for active infection at this time  - defer intervention given uncertain benefit, clinical stability, and absence of time urgency  - recommend wait-and-watch approach, as permitted by urgency of LVAD placement, with repeat CT in 4-6 weeks  - consider monitoring off ABX  - if intervention is desired now, consult IR given small size of opacity and its proximity to numerous anatomical structures           Pulmonary embolism     - agree with systemic AC  - no recommendation to interrupt it as no plans for intervention at this time         Pt seen and examined and plan discussed with Dr. Cerrato, staff. Thank you for this consult. Pulmonary will sign off.     Dejuan Ortega MD  Fellow, Pulmonary & Critical Care Medicine  spectralink [phone]

## 2018-05-25 NOTE — NURSING
Patient BP 86/70 manually. Ileana SALCEDO notified. No interventions at this time. Will continue to monitor.

## 2018-06-08 ENCOUNTER — DOCUMENTATION ONLY (OUTPATIENT)
Dept: TRANSPLANT | Facility: CLINIC | Age: 53
End: 2018-06-08

## 2018-06-08 NOTE — PROGRESS NOTES
Pt no-show to clinic 6/7/18.  Pt had previously signed out AMA from the hospital 2 weeks prior    Discussed case with Dr Kessler.  Preht file closed as pt is not a candidate for advanced options due to the above.

## 2019-02-22 PROBLEM — J44.1 ACUTE EXACERBATION OF CHRONIC OBSTRUCTIVE PULMONARY DISEASE (COPD): Status: ACTIVE | Noted: 2019-02-22

## 2019-02-27 PROBLEM — R79.89 ELEVATED LFTS: Status: ACTIVE | Noted: 2019-02-27

## 2019-02-27 PROBLEM — R57.0 CARDIOGENIC SHOCK: Status: ACTIVE | Noted: 2019-02-27

## 2019-02-27 PROBLEM — J96.01 ACUTE RESPIRATORY FAILURE WITH HYPOXIA: Status: ACTIVE | Noted: 2019-02-27

## 2019-02-27 PROBLEM — N17.9 AKI (ACUTE KIDNEY INJURY): Status: ACTIVE | Noted: 2019-02-27

## 2019-02-27 PROBLEM — J44.9 COPD (CHRONIC OBSTRUCTIVE PULMONARY DISEASE): Status: ACTIVE | Noted: 2019-02-22

## 2019-02-27 PROBLEM — I50.84 CHF (NYHA CLASS IV, ACC/AHA STAGE D): Status: ACTIVE | Noted: 2018-05-21

## 2019-02-28 PROBLEM — K72.00 SHOCK LIVER: Status: ACTIVE | Noted: 2019-02-28

## 2019-03-01 PROBLEM — M89.9 CHRONIC KIDNEY DISEASE-MINERAL AND BONE DISORDER: Status: ACTIVE | Noted: 2019-03-01

## 2019-03-01 PROBLEM — E83.9 CHRONIC KIDNEY DISEASE-MINERAL AND BONE DISORDER: Status: ACTIVE | Noted: 2019-03-01

## 2019-03-01 PROBLEM — N18.9 CHRONIC KIDNEY DISEASE-MINERAL AND BONE DISORDER: Status: ACTIVE | Noted: 2019-03-01

## 2019-03-01 PROBLEM — E63.9 INADEQUATE DIETARY ENERGY INTAKE: Status: ACTIVE | Noted: 2019-03-01

## 2019-03-02 PROBLEM — N25.81 SECONDARY HYPERPARATHYROIDISM: Status: ACTIVE | Noted: 2019-03-02

## 2019-03-02 PROBLEM — M62.82 RHABDOMYOLYSIS: Status: ACTIVE | Noted: 2019-03-02

## 2019-03-03 PROBLEM — E87.0 HYPERNATREMIA: Status: ACTIVE | Noted: 2019-03-03

## 2019-03-09 PROBLEM — R06.02 SOB (SHORTNESS OF BREATH): Status: ACTIVE | Noted: 2019-03-09

## 2019-03-10 PROBLEM — E87.0 HYPERNATREMIA: Status: RESOLVED | Noted: 2019-03-03 | Resolved: 2019-03-10

## 2019-03-12 PROBLEM — D64.9 SYMPTOMATIC ANEMIA: Status: ACTIVE | Noted: 2019-03-12
